# Patient Record
Sex: FEMALE | Race: WHITE | NOT HISPANIC OR LATINO | Employment: STUDENT | URBAN - METROPOLITAN AREA
[De-identification: names, ages, dates, MRNs, and addresses within clinical notes are randomized per-mention and may not be internally consistent; named-entity substitution may affect disease eponyms.]

---
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
## 2019-04-13 ENCOUNTER — OFFICE VISIT - RIVER FALLS (OUTPATIENT)
Dept: FAMILY MEDICINE | Facility: CLINIC | Age: 19
End: 2019-04-13

## 2019-04-16 LAB — AEROBIC CULTURE: NORMAL

## 2020-12-23 ENCOUNTER — OFFICE VISIT - RIVER FALLS (OUTPATIENT)
Dept: FAMILY MEDICINE | Facility: CLINIC | Age: 20
End: 2020-12-23

## 2020-12-28 ENCOUNTER — COMMUNICATION - RIVER FALLS (OUTPATIENT)
Dept: FAMILY MEDICINE | Facility: CLINIC | Age: 20
End: 2020-12-28

## 2021-04-14 ENCOUNTER — COMMUNICATION - RIVER FALLS (OUTPATIENT)
Dept: FAMILY MEDICINE | Facility: CLINIC | Age: 21
End: 2021-04-14

## 2021-04-19 ENCOUNTER — OFFICE VISIT - RIVER FALLS (OUTPATIENT)
Dept: FAMILY MEDICINE | Facility: CLINIC | Age: 21
End: 2021-04-19

## 2021-08-04 ENCOUNTER — OFFICE VISIT - RIVER FALLS (OUTPATIENT)
Dept: FAMILY MEDICINE | Facility: CLINIC | Age: 21
End: 2021-08-04

## 2021-08-12 ENCOUNTER — OFFICE VISIT - RIVER FALLS (OUTPATIENT)
Dept: FAMILY MEDICINE | Facility: CLINIC | Age: 21
End: 2021-08-12

## 2021-09-27 ENCOUNTER — OFFICE VISIT - RIVER FALLS (OUTPATIENT)
Dept: FAMILY MEDICINE | Facility: CLINIC | Age: 21
End: 2021-09-27

## 2021-09-27 ASSESSMENT — MIFFLIN-ST. JEOR: SCORE: 1496.37

## 2021-10-01 ENCOUNTER — COMMUNICATION - RIVER FALLS (OUTPATIENT)
Dept: FAMILY MEDICINE | Facility: CLINIC | Age: 21
End: 2021-10-01

## 2022-01-11 ENCOUNTER — COMMUNICATION - RIVER FALLS (OUTPATIENT)
Dept: FAMILY MEDICINE | Facility: CLINIC | Age: 22
End: 2022-01-11
Payer: COMMERCIAL

## 2022-02-11 VITALS
TEMPERATURE: 98.1 F | DIASTOLIC BLOOD PRESSURE: 76 MMHG | HEART RATE: 76 BPM | WEIGHT: 161 LBS | HEART RATE: 92 BPM | WEIGHT: 165 LBS | SYSTOLIC BLOOD PRESSURE: 122 MMHG | HEIGHT: 67 IN | BODY MASS INDEX: 25.27 KG/M2

## 2022-02-11 VITALS
WEIGHT: 140.4 LBS | BODY MASS INDEX: 21.99 KG/M2 | HEART RATE: 89 BPM | OXYGEN SATURATION: 98 % | DIASTOLIC BLOOD PRESSURE: 70 MMHG | TEMPERATURE: 98.3 F | SYSTOLIC BLOOD PRESSURE: 116 MMHG

## 2022-02-11 VITALS
HEIGHT: 67 IN | WEIGHT: 158 LBS | BODY MASS INDEX: 24.8 KG/M2 | OXYGEN SATURATION: 98 % | HEART RATE: 106 BPM | SYSTOLIC BLOOD PRESSURE: 130 MMHG | DIASTOLIC BLOOD PRESSURE: 70 MMHG | TEMPERATURE: 97.8 F

## 2022-02-11 VITALS
BODY MASS INDEX: 25.06 KG/M2 | BODY MASS INDEX: 26 KG/M2 | SYSTOLIC BLOOD PRESSURE: 108 MMHG | HEART RATE: 72 BPM | SYSTOLIC BLOOD PRESSURE: 118 MMHG | WEIGHT: 156.4 LBS | DIASTOLIC BLOOD PRESSURE: 76 MMHG | HEART RATE: 74 BPM | SYSTOLIC BLOOD PRESSURE: 114 MMHG | WEIGHT: 166 LBS | BODY MASS INDEX: 24.55 KG/M2 | HEIGHT: 67 IN | HEART RATE: 76 BPM | DIASTOLIC BLOOD PRESSURE: 72 MMHG | WEIGHT: 160 LBS | DIASTOLIC BLOOD PRESSURE: 72 MMHG

## 2022-02-16 NOTE — TELEPHONE ENCOUNTER
---------------------  From: Michelle Guillen LPN (Phone Messages Pool ([id number]_[facility]))   Sent: 12/8/2021 10:03:58 AM CST  Subject: Adderall refill     Phone Message    PCP:   none asked for KWL      Time of Call:  9:58am       Person Calling:  pt  Phone number:  _    Returned call at: _    Note:   Pt calling requesting Adderall refill. Pt says it is finals week and she is needing her Rx.  Told her Rx was sent to [facility] on 11/5/21 t o fill in 30 days. She says she has spoken to Caroline and they will not refill it.    Called [facility] and they do have Rx on her profile. They will contact pt once ready.    Last office visit and reason:  9/27/21 female normal annual exam, med refill

## 2022-02-16 NOTE — LETTER
(Inserted Image. Unable to display)   August 20, 2021    YINA MART  [address] [place] [address] [place] [id number]            Dear YINA,      Thank you for selecting [facility] for your healthcare needs.    Our records indicate you are due for the following services:     Follow-up office visit.    (FYI   Regarding office visits: In some instances, a video visit or telephone visit may be offered as an option.)      To schedule an appointment or if you have further questions, please contact your clinic at [phone].      Powered by [employer]    Sincerely,    Vielka Foster MD

## 2022-02-16 NOTE — PROGRESS NOTES
Patient:   YINA ARGUETA            MRN: [id number]            FIN: [id number]               Age:   21 years     Sex:  Female     :  2000   Associated Diagnoses:   Well adult exam; Moderate depressive episode; ADD (attention deficit disorder)   Author:   Vielka Foster MD      Visit Information   Visit type:  Annual exam.    Source of history:  Self.    History limitation:  None.       Chief Complaint   2021 12:27 PM CDT   Patient presents for annual physical and ADHD medicaiton follow up.      Well Adult History   Well Adult History             The patient presents for well adult exam.  The patient's general health status is described as good.  The patient's diet is described as balanced.  Exercise: routine, 2  times per week.  Associated symptoms consist of none.  Last menstrual period: regular, on cycled OCP, gets period every 3 months.  Medical encounters: none.  Additional pertinent history: in long term relationship, declines GC and chlamydia testing.     depression and adhd follow up  patient notes Viibryd is helping somewhat but does not feel like it is fully helpful, has been recommended to see a psychiatrist  had been to psychiatrist previously in [place] but did not have good connection  adhd has been somewhat controlled but feels like symptoms are still present, wondering if there is a slightly higher dose that could be helpful      Review of Systems   ROS reviewed as documented in chart      Health Status   Allergies:    Allergic Reactions (Selected)  Severity Not Documented  Sulfa drug (No reactions were documented)   Medications:  (Selected)   Prescriptions  Prescribed  Adderall XR 20 mg oral capsule, extended release: = 1 cap(s) ( 20 mg ), PO, qAM, # 30 cap(s), 0 Refill(s), Type: Maintenance, Pharmacy: [facility] #[id number], 1 cap(s) Oral qam  Adderall XR 20 mg oral capsule, extended release: = 1 cap(s) ( 20 mg ), PO, qAM, Instructions: fill in 30 days, # 30 cap(s), 0 Refill(s),  Type: Maintenance, Pharmacy: [facility] #[id number], 1 cap(s) Oral qam,Instr:fill in 30 days  Introvale 0.15 mg 30 mcg oral tablet: 1 tab(s), Oral, daily, # 91 tab(s), 4 Refill(s), Type: Maintenance, Pharmacy: [facility] #[id number], 1 tab(s) Oral daily  ProAir HFA 90 mcg/inh inhalation aerosol: 2 puff(s), inh, q4-6 hrs, # 1 EA, 2 Refill(s), Type: Maintenance, Pharmacy: [facility] [id number], 2 puff(s) inh q4-6 hrs  Viibryd 20 mg oral tablet: = 1 tab(s) ( 20 mg ), Oral, daily, # 90 tab(s), 1 Refill(s), Type: Maintenance, Pharmacy: [facility] #[id number], 1 tab(s) Oral daily  chamber spacer with mouthpiece  for use with albuterol: chamber spacer with mouthpiece  for use with albuterol, See Instructions, Instructions: Use with albuterol., # 1 EA, 0 Refill(s), Type: Maintenance, Pharmacy: [facility] [id number],    Medications          *denotes recorded medication          chamber spacer with mouthpiece  for use with albuterol: See Instructions, Use with albuterol., 1 EA.          ProAir HFA 90 mcg/inh inhalation aerosol: 2 puff(s), inh, q4-6 hrs, 1 EA, 2 Refill(s).          Adderall XR 20 mg oral capsule, extended release: 20 mg, 1 cap(s), PO, qAM, fill in 30 days, 30 cap(s), 0 Refill(s).          Adderall XR 20 mg oral capsule, extended release: 20 mg, 1 cap(s), PO, qAM, 30 cap(s), 0 Refill(s).          Introvale 0.15 mg 30 mcg oral tablet: 1 tab(s), Oral, daily, 91 tab(s), 4 Refill(s).          Viibryd 20 mg oral tablet: 20 mg, 1 tab(s), Oral, daily, 90 tab(s), 1 Refill(s).       Problem list:    All Problems  Sleep disorder / SNOMED CT 86694940 / Confirmed  Seasonal allergies / SNOMED CT 108052085 / Confirmed  POTS (postural orthostatic tachycardia syndrome) / SNOMED CT 2858856913 / Confirmed  Moderate depressive episode / SNOMED CT 223333796 / Confirmed  History of chicken pox / SNOMED CT 150556409 / Confirmed  Dysmenorrhea in the adolescent / SNOMED CT 101521348 / Confirmed  Chronic headache disorder /  SNOMED CT 2757974907 / Confirmed  ADD (attention deficit disorder) / SNOMED CT 44458007 / Confirmed  Canceled: Pots (Postural Orthostatic Tachycardia Syndrome) / ICD-9-.0      Histories   Past Medical History:    Active  History of chicken pox (SNOMED CT 895382992): Onset in 2001 at 1 years.  POTS (postural orthostatic tachycardia syndrome) (SNOMED CT 9381314972)  Dysmenorrhea in the adolescent (SNOMED CT 011808741)  Sleep disorder (SNOMED CT 74144345)  Chronic headache disorder (SNOMED CT 8608825193)  Seasonal allergies (SNOMED CT 891075702)   Family History:    Chronic pain syndrome  Mother (Dahlia Mcgraw)     Procedure history:    Esophagogastroduodenoscopy ([id number]) on 6/13/2014 at 14 Years.  Comments:  6/17/2014 2:48 PM CDT - Amita SIMMONS, Irma  Sedation: MAC  Indication: epigastric abdominal pain  Duodenum biopsy: small bowel mucosa without abnormality, no evidence of celiac  Stomach, antrum biopsy: gastric antral mucosa with no abnormalities, no helicobacter organisms identified  Clotest negative   Social History:        Electronic Cigarette/Vaping Assessment            Electronic Cigarette Use: Never.      Alcohol Assessment            Never      Tobacco Assessment            Never            Never (less than 100 in lifetime)      Substance Abuse Assessment            Never      Employment and Education Assessment            Student      Home and Environment Assessment            Marital status: Single.      Nutrition and Health Assessment            Type of diet: Regular.      Exercise and Physical Activity Assessment            Exercise frequency: 1-2 times/week.  Exercise type: Running.      Sexual Assessment            Sexually active: Yes.  Sexual orientation: Heterosexual.  Contraceptive Use Details: Birth control pill,               Condoms.        Physical Examination   Vital Signs   9/27/2021 12:27 PM CDT Temperature Tympanic 97.8 DegF  LOW    Peripheral Pulse Rate 106 bpm  HI    HR Method  Electronic    Systolic Blood Pressure 130 mmHg    Diastolic Blood Pressure 70 mmHg    Mean Arterial Pressure 90 mmHg    BP Site Right arm    BP Method Manual    Oxygen Saturation 98 %      Measurements from flowsheet : Measurements   9/27/2021 12:27 PM CDT Height Measured - Standard 66.5 in    Weight Measured - Standard 158 lb    BSA 1.83 m2    Body Mass Index 25.12 kg/m2  HI      General:  Alert and oriented, No acute distress.    Eye:  Pupils are equal, round and reactive to light, Extraocular movements are intact, Normal conjunctiva.    HENT:  Normocephalic, Tympanic membranes are clear, Oral mucosa is moist, No pharyngeal erythema.    Neck:  Supple, Non-tender, No lymphadenopathy, No thyromegaly.    Respiratory:  Lungs are clear to auscultation.    Cardiovascular:  Normal rate, Regular rhythm.    Breast:  No mass, No tenderness, No discharge.    Gastrointestinal:  Soft, Non-tender, Non-distended, No organomegaly.    Genitourinary:  Normal genitalia for age and sex, No urethral discharge, No lesions.         Perineum: Within normal limits.         Vagina: Within normal limits.         Uterus: Within normal limits.         Ovaries: Within normal limits.         Adnexa: Within normal limits.    Musculoskeletal:  Normal range of motion, Normal strength.    Integumentary:  Warm, Dry, Pink, No rash, no concerning lesions.    Neurologic:  Alert, Oriented, Normal sensory.    Psychiatric:  Cooperative, Appropriate mood & affect.       Impression and Plan   Diagnosis     Well adult exam (JHQ70-HA Z00.00).     Course:  Progressing as expected.    Patient Instructions:       Counseled: Patient, BMI, diet, and exercise.    Diagnosis     Moderate depressive episode (SMI40-CC F32.1).     Course:  will pursue psychiatry referral, order placed.    Orders     Orders (Selected)   Prescriptions  Prescribed  Viibryd 20 mg oral tablet: = 1 tab(s) ( 20 mg ), Oral, daily, # 90 tab(s), 3 Refill(s), Type: Maintenance, Pharmacy: [facility]  [facility]ICKY Ramirez, hold on file, 1 tab(s) Oral daily, 66.5, in, 09/27/21 12:27:00 CDT, Height Measured, 158, lb, 09/27/21 12:27:00 CDT, Weight Measured.     Diagnosis     ADD (attention deficit disorder) (AWE65-LC F98.8).     Course:  persistent symptoms, not fully controlled, will trial increased dose of medications to 25mg and monitor. Follow up in 4 months.    Orders     Orders (Selected)   Prescriptions  Prescribed  Adderall XR 25 mg oral capsule, extended release: = 1 cap(s) ( 25 mg ), Oral, qam, # 30 cap(s), 0 Refill(s), Type: Maintenance, Pharmacy: [facility] [place], [place], 1 cap(s) Oral qam, 66.5, in, 09/27/21 12:27:00 CDT, Height Measured, 158, lb, 09/27/21 12:27:00 CDT, Weight Measured  Adderall XR 25 mg oral capsule, extended release: = 1 cap(s) ( 25 mg ), Oral, qam, Instructions: fill in 30 days, # 30 cap(s), 0 Refill(s), Type: Maintenance, Pharmacy: [facility] [place], [place], 1 cap(s) Oral qam,Instr:fill in 30 days, 66.5, in, 09/27/21 12:27:00 CDT, Height Measured, 158, lb, 09/27/21 12....

## 2022-02-16 NOTE — LETTER
(Inserted Image. Unable to display)   June 21, 2021    YINA MART  [address] [place] [address] [place] [id number]            Dear YINA,      Thank you for selecting [facility] for your healthcare needs.    Our records indicate you are due for the following services:     Annual Physical and Gynecologic Exam ~ Yearly wellness exams are important for your ongoing health and wellness.  This exam gives you the opportunity to meet with your health care provider to review your health, update immunizations and to recommend preventive screenings that you may be due for.  At your wellness visit, your Healthcare Provider will determine the need for a gynecologic exam and/or Pap smear.    (FYI   Regarding office visits: In some instances, a video visit or telephone visit may be offered as an option.)      To schedule an appointment or if you have further questions, please contact your clinic at [phone].      Powered by [employer]    Sincerely,    Vielka Foster MD

## 2022-02-16 NOTE — PROGRESS NOTES
Patient:   YINA ARGUETA            MRN: [id number]            FIN: [id number]               Age:   21 years     Sex:  Female     :  2000   Associated Diagnoses:   ADD (attention deficit disorder)   Author:   Vielka Foster MD      Visit Information      Date of Service: 2021 06:32 am  Performing Location: [facility]  Encounter#: [id number]      Primary Care Provider (PCP):  NONE ,       Referring Provider:  Vielka Foster MD    NPI# [id number]   Participants in room during visit:  patient, self   Location of patient:  home  Location of provider:  office  Video Start Time:  0801 am  Video End Time:   807 am  Video visit via [employer]    Today's visit was conducted via video conference due to the COVID-19 pandemic.  The patient's consent to proceed with a video visit has been obtained and documented.      Chief Complaint   2021 7:38 AM CDT    ADHD med check-verbal consent obtained for a video visit.      History of Present Illness   Patient is being evaluated via a billable video visit.  patient due for ADHD follow up  had been struggling with focus and energy, better on Adderall XR   feels like depression is controlled on 20mg  has not yet started back in school  between jobs  hoping to get back to school this fall  has been getting more done around the house         Health Status   Allergies:    Allergic Reactions (Selected)  Severity Not Documented  Sulfa drug (No reactions were documented)   Medications:  (Selected)   Prescriptions  Prescribed  Adderall XR 20 mg oral capsule, extended release: = 1 cap(s) ( 20 mg ), PO, qAM, # 30 cap(s), 0 Refill(s), Type: Maintenance, Pharmacy: [facility] #[id number], did not fill prior to returning to [place], has been cancelled at [facility] in [place], [place], 1 cap(s) Oral qam, 140.4, lb, 19 8:...  Adderall XR 20 mg oral capsule, extended release: = 1 cap(s) ( 20 mg ), PO, qAM, Instructions: fill in 30 days, # 30 cap(s),  0 Refill(s), Type: Maintenance, Pharmacy: [facility] #[id number], 1 cap(s) Oral qam,Instr:fill in 30 days, 140.4, lb, 04/13/19 8:15:00 CDT, Weight Measured  Introvale 0.15 mg 30 mcg oral tablet: 1 tab(s), Oral, daily, # 91 tab(s), 4 Refill(s), Type: Maintenance, Pharmacy: [facility] #[id number], 1 tab(s) Oral daily  ProAir HFA 90 mcg/inh inhalation aerosol: 2 puff(s), inh, q4-6 hrs, # 1 EA, 2 Refill(s), Type: Maintenance, Pharmacy: [facility] [id number], 2 puff(s) inh q4-6 hrs  Viibryd 20 mg oral tablet: = 1 tab(s) ( 20 mg ), Oral, daily, # 90 tab(s), 1 Refill(s), Type: Maintenance, Pharm[facility]: [id number], 1 tab(s) Oral daily  chamber spacer with mouthpiece  for use with albuterol: chamber spacer with mouthpiece  for use with albuterol, See Instructions, Instructions: Use with albuterol., # 1 EA, 0 Refill(s), Type: Maintenance, Pharmacy: [facility] [id number]   Problem list:    All Problems  ADD (attention deficit disorder) / SNOMED CT 01238570 / Confirmed  Chronic headache disorder / SNOMED CT 5873544536 / Confirmed  Dysmenorrhea in the adolescent / SNOMED CT 515502871 / Confirmed  History of chicken pox / SNOMED CT 738720058 / Confirmed  Moderate depressive episode / SNOMED CT 441376532 / Confirmed  POTS (postural orthostatic tachycardia syndrome) / SNOMED CT 4139145210 / Confirmed  Sleep disorder / SNOMED CT 77283063 / Confirmed  Seasonal allergies / SNOMED CT 434027894 / Confirmed      Histories   Past Medical History:    Active  History of chicken pox (SNOMED CT 570248274): Onset in 2001 at 1 years.  POTS (postural orthostatic tachycardia syndrome) (SNOMED CT 9706882184)  Dysmenorrhea in the adolescent (SNOMED CT 344791033)  Sleep disorder (SNOMED CT 59318640)  Chronic headache disorder (SNOMED CT 3476164687)  Seasonal allergies (SNOMED CT 854165123)   Family History:    Chronic pain syndrome  Mother (Dahlia Mcgraw)     Procedure history:    Esophagogastroduodenoscopy ([id number]) on  6/13/2014 at 14 Years.  Comments:  6/17/2014 2:48 PM DOMINGO Levi RN, Irma  Sedation: MAC  Indication: epigastric abdominal pain  Duodenum biopsy: small bowel mucosa without abnormality, no evidence of celiac  Stomach, antrum biopsy: gastric antral mucosa with no abnormalities, no helicobacter organisms identified  Clotest negative   Social History:        Electronic Cigarette/Vaping Assessment            Electronic Cigarette Use: Never.      Alcohol Assessment            Never      Tobacco Assessment            Never            Never (less than 100 in lifetime)      Substance Abuse Assessment            Never      Employment and Education Assessment            Student      Home and Environment Assessment            Marital status: Single.      Nutrition and Health Assessment            Type of diet: Regular.      Exercise and Physical Activity Assessment            Exercise frequency: 1-2 times/week.  Exercise type: Running.      Sexual Assessment            Sexually active: Yes.  Sexual orientation: Heterosexual.  Contraceptive Use Details: Birth control pill,               Condoms.        Physical Examination   General:  Alert and oriented, No acute distress.    Eye:  Pupils are equal, round and reactive to light, Normal conjunctiva.    HENT:  Oral mucosa is moist.    Neck:  Supple.    Respiratory:  Respirations are non-labored.    Psychiatric:  Cooperative, Appropriate mood & affect, Normal judgment.       Impression and Plan   Diagnosis     ADD (attention deficit disorder) (PNI09-ML F98.8).     Course:  Improving.    Orders     Orders (Selected)   Outpatient Orders  Ordered  Return to Clinic (Request): RFV: Yearly px, Return in 2 mo  Order  Return to Clinic (Request): RFV: call for ADHD refills in 2 months; visit due in 4 months, Return in 4 months  Prescriptions  Prescribed  Adderall XR 20 mg oral capsule, extended release: = 1 cap(s) ( 20 mg ), PO, qAM, # 30 cap(s), 0 Refill(s), Type: Maintenance,  Pharmacy: [facility] #[id number], did not fill prior to returning to [place], has been cancelled at [facility] in [place], [place], 1 cap(s) Oral qam  Adderall XR 20 mg oral capsule, extended release: = 1 cap(s) ( 20 mg ), PO, qAM, Instructions: fill in 30 days, # 30 cap(s), 0 Refill(s), Type: Maintenance, Pharmacy: [facility] #[id number], 1 cap(s) Oral qam,Instr:fill in 30 days.

## 2022-02-16 NOTE — TELEPHONE ENCOUNTER
---------------------  From: Dk/Anne PARSONS (Phone Messages Mineralist ([id number]_[facility]))   To: Vielka Foster MD;     Sent: 8/4/2021 8:54:20 AM CDT  Subject: General Message     Phone Message    PCP: KWL    Time of Call: 0848    Phone Number: [phone]    Returned call at: n/a    Note: Patient calls stating that she has a px today with KWL but on Saturday she came down with sinus pressure/coughing. Patient states that she has not been exposed to covid that she is aware of and has had both coivd vaccines. Patient wondering if she is ok to come in today or if she should reschedule.    What is KWL preferences?OK to come if that is her preference, can reschedule if not feeling up to visit or change to virtual to discuss cough/ADHD if she is in [place] and delay physical.---------------------  From: Vielka Foster MD   To: Phone Messages Pool ([id number]_[place] [facility] [place]);     Sent: 8/4/2021 9:12:51 AM CDT  Subject: RE: General MessageSpoke to pt, she states that she would like to reschedule as she does not think she will have a voice later today.

## 2022-02-16 NOTE — NURSING NOTE
Depression Screening Entered On:  10/14/2021 11:05 AM CDT    Performed On:  9/27/2021 11:04 AM CDT by Rosalie Ho               Depression Screening   Little Interest - Pleasure in Activities :   Several days   Feeling Down, Depressed, Hopeless :   Several days   Initial Depression Screen Score :   2 Score   Poor Appetite or Overeating :   Several days   Trouble Falling or Staying Asleep :   Several days   Feeling Tired or Little Energy :   Not at all   Feeling Bad About Yourself :   Several days   Trouble Concentrating :   Nearly every day   Moving or Speaking Slowly :   Not at all   Thoughts Better Off Dead or Hurting Self :   Not at all   Difficulty at Work, Home, Getting Along :   Somewhat difficult   Detailed Depression Screen Score :   6    Total Depression Screen Score :   8    Rosalie Ho - 10/14/2021 11:04 AM CDT

## 2022-02-16 NOTE — NURSING NOTE
Comprehensive Intake Entered On:  4/13/2019 8:18 AM CDT    Performed On:  4/13/2019 8:15 AM CDT by Page Sharp               Summary   Chief Complaint :   Skin concern on back of left leg.  Concern for ingrown hair.    Menstrual Status :   Menarcheal   Weight Measured :   140.4 lb(Converted to: 140 lb 6 oz, 63.68 kg)    Systolic Blood Pressure :   116 mmHg   Diastolic Blood Pressure :   70 mmHg   Mean Arterial Pressure :   85 mmHg   Peripheral Pulse Rate :   89 bpm   Temperature Tympanic :   98.3 DegF(Converted to: 36.8 DegC)    Oxygen Saturation :   98 %   Race :      Languages :   English   Ethnicity :   Not  or    Page Sharp - 4/13/2019 8:15 AM CDT   Health Status   Allergies Verified? :   Yes   Medication History Verified? :   Yes   Tobacco Use? :   Never smoker   Page Sharp - 4/13/2019 8:15 AM CDT   Meds / Allergies   (As Of: 4/13/2019 8:18:09 AM CDT)   Allergies (Active)   sulfa drug  Estimated Onset Date:   Unspecified ; Comments:     Comment 1: Mom and grandma highly allergic to sulfa   ; Created By:   Berta León; Reaction Status:   Active ; Category:   Drug ; Substance:   sulfa drug ; Type:   Allergy ; Updated By:   Berta León; Reviewed Date:   8/16/2017 1:23 PM CDT        Medication List   (As Of: 4/13/2019 8:18:09 AM CDT)   Prescription/Discharge Order    ethinyl estradiol-levonorgestrel  :   ethinyl estradiol-levonorgestrel ; Status:   Prescribed ; Ordered As Mnemonic:   Quasense 0.15 mg-30 mcg oral tablet ; Simple Display Line:   1 tab(s), po, daily, for 30 day(s), 30 tab(s), 0 Refill(s) ; Ordering Provider:   April Fried; Catalog Code:   ethinyl estradiol-levonorgestrel ; Order Dt/Tm:   12/21/2017 11:43:19 AM          buPROPion  :   buPROPion ; Status:   Processing ; Ordered As Mnemonic:   Wellbutrin  mg/24 hours oral tablet, extended release ; Ordering Provider:   April Fried; Action Display:   Complete ; Catalog Code:    buPROPion ; Order Dt/Tm:   4/13/2019 8:16:07 AM          amphetamine-dextroamphetamine  :   amphetamine-dextroamphetamine ; Status:   Prescribed ; Ordered As Mnemonic:   Adderall XR 20 mg oral capsule, extended release ; Simple Display Line:   20 mg, 1 cap(s), PO, qAM, 30 cap(s), 0 Refill(s) ; Ordering Provider:   April Fried; Catalog Code:   amphetamine-dextroamphetamine ; Order Dt/Tm:   8/4/2017 5:38:18 PM          amphetamine-dextroamphetamine  :   amphetamine-dextroamphetamine ; Status:   Processing ; Ordered As Mnemonic:   Adderall XR 20 mg oral capsule, extended release ; Ordering Provider:   April Fried; Action Display:   Complete ; Catalog Code:   amphetamine-dextroamphetamine ; Order Dt/Tm:   4/13/2019 8:16:14 AM          naproxen  :   naproxen ; Status:   Prescribed ; Ordered As Mnemonic:   Naprosyn 500 mg oral tablet ; Simple Display Line:   500 mg, 1 tab(s), PO, BID, 60 tab(s), 0 Refill(s) ; Ordering Provider:   April Fried; Catalog Code:   naproxen ; Order Dt/Tm:   6/5/2017 6:10:10 PM          albuterol  :   albuterol ; Status:   Prescribed ; Ordered As Mnemonic:   ProAir HFA 90 mcg/inh inhalation aerosol ; Simple Display Line:   2 puff(s), inh, q4-6 hrs, 1 EA, 2 Refill(s) ; Ordering Provider:   Jackie Armijo MD; Catalog Code:   albuterol ; Order Dt/Tm:   9/21/2015 5:20:08 PM          Miscellaneous Prescription  :   Miscellaneous Prescription ; Status:   Prescribed ; Ordered As Mnemonic:   chamber spacer with mouthpiece  for use with albuterol ; Simple Display Line:   See Instructions, Use with albuterol., 1 EA ; Ordering Provider:   Macy Garcia MD; Catalog Code:   Miscellaneous Prescription ; Order Dt/Tm:   9/20/2012 9:48:17 AM

## 2022-02-16 NOTE — TELEPHONE ENCOUNTER
Entered by Vielka Foster MD on November 05, 2021 1:03:30 PM CDT  ---------------------  From: Vielka Foster MD   To: MICKY Long    Sent: 11/5/2021 1:03:30 PM CDT  Subject: Medication Management     ** Not Approved: responded to by other means **  amphetamine-dextroamphetamine (ADDERALL XR 25MG CP24)  TAKE ONE CAPSULE BY MOUTH EVERY DAY IN THE MORNING  Qty:  30 cap(s)        Days Supply:  30        Refills:  0          NOHEMI     Route To Pharmacy - [facility] [place], [place]               ** Patient matched by Vielka Foster MD on 11/5/2021 1:03:13 PM CDT **      ------------------------------------------  From: MICKY Long  To: Vielka Foster MD  Sent: November 4, 2021 3:52:44 PM CDT  Subject: Medication Management  Due: October 21, 2021 8:18:07 PM CDT     ** On Hold Pending Signature **     Drug: amphetamine-dextroamphetamine (Adderall XR 25 mg oral capsule, extended release), TAKE ONE CAPSULE BY MOUTH EVERY DAY IN THE MORNING  Quantity: 30 cap(s)  Days Supply: 30  Refills: 0  Substitutions Allowed  Notes from Pharmacy:     Dispensed Drug: amphetamine-dextroamphetamine (Adderall XR 25 mg oral capsule, extended release), TAKE ONE CAPSULE BY MOUTH EVERY DAY IN THE MORNING  Quantity: 30 cap(s)  Days Supply: 30  Refills: 0  Substitutions Allowed  Notes from Pharmacy:  ------------------------------------------  ** Submitted: **  Order:amphetamine-dextroamphetamine (Adderall XR 25 mg oral capsule, extended release)  1 cap(s)  Oral  qam  fill in 30 days  Qty:  30 cap(s)        Refills:  0          Substitutions Allowed     Route To Pharmacy - [facility] [place], [place]    Signed by Vielka Foster MD  11/5/2021 6:03:00 PM [facility]    ** Submitted: **  Order:amphetamine-dextroamphetamine (Adderall XR 25 mg oral capsule, extended release)  1 cap(s)  Oral  qam  Qty:  30 cap(s)        Refills:  0          Substitutions Allowed     Route To Pharmacy - [facility] [place], [place]    Signed by Vielka Foster MD  11/5/2021  6:03:00 PM [facility]

## 2022-02-16 NOTE — TELEPHONE ENCOUNTER
"---------------------  From: Michelle Guillen LPN (Phone Messages Pool (32224_[place]))   To: [facility] Message Pool ([address]);     Sent: 12/23/2020 1:59:18 PM CST  Subject: Adderall     Phone Message    PCP:   none asked for [facility]      Time of Call:  1:27pm       Person Calling:  Holly Damian [facility]  Phone number:  [phone]    Returned call at: 1:39pm    Note:   Mayajay LM stating they received Rx for Adderall and need some verification because Rx came from a family medicine clinic. Mayajay says this should really be going through psychiatry or mental health before going to primary clinic. He says this is the first time they have dealt with pt.    Returned call to see what verification Betty was needing. He thought it was suspicious that pt had an appt with provider in [place] but using a [place] pharmacy. Betty says that now the best approach with pharmacies in [place] is to use a non controlled substance approach first (Strattera or clonidine) or therapy. Betty says they are having a problem in [place] right now with Vyvanse and Adderall and there has been \"drug busts\" so they are now monitoring Rx's closely. Betty suggested pt try and fail a non controlled substance approach first before starting on Adderall since it did not look like she has been on it in quite some time.    Deneen says they will need call back with ok to dispense.    Last office visit and reason:  12/23/20 phone visit---------------------  From: Nanette Duarte CMA ([facility] Message Pool (32224_[facility]))   To: Vielka Foster MD;     Sent: 12/23/2020 2:26:01 PM CST  Subject: FW: AdderallOK to dispense. Patient diagnosed with ADHD in elementary school. Returning to school. Adderall had previously been used for patient starting in 2017 with good results. Had discontinued while not in school. Has seen psychiatry in the past.---------------------  From: Vielka Foster MD   To: [facility] Message Pool " (32224_[facility]);     Sent: 12/23/2020 2:32:15 PM CST  Subject: RE: AdderallCalled and informed Betty at 2:38pm. He expresses understanding and agrees to dispense for pt.

## 2022-02-16 NOTE — TELEPHONE ENCOUNTER
---------------------  From: Tina HAYWARDDavid   To: [facility] Message Pool (32224_[facility]);     Sent: 4/14/2021 3:42:04 PM CDT  Subject: General Message     Phone message    PCP:   Noah MAHONEY      Time of Call:  _ 330       Person Calling:  _ Self  Phone number:  _ [phone]    Returned call at: _    Note:   _ Patient is switching pharmacy to [facility] in [place] and would like new scripts sent in for Adderall and Birth control.  Orders pended.  Pharmacy changed.  Routing for review.    Last office visit and reason:  _Looks like David teed up refills. Can you sign off on BCP refill? She is due for a ADHD med check so I will let her know this (last visit in Dec 2020)---------------------  From: Nathalie Patterson CMA ([employer] Message Pool (32224_[facility]))   To: Vielka Foster MD;     Sent: 4/14/2021 3:45:34 PM CDT  Subject: FW: General MessageCan you call pt to get scheduled for ADHD med check w/ [facility]? Needs to have visit for refills. Could do phone visit tomorrow---------------------  From: Nathalie Patterson CMA ([facility] Message Pool (32224_[facility]))   To: Appointment Pool (32224_[place]);     Sent: 4/14/2021 5:28:19 PM CDT  Subject: FW: General MessageSigned the viibryd and OCP prescriptions. Agree with visit for adhd---------------------  From: Vielka Foster MD   To: [facility] Message Pool (32224_[facility]);     Sent: 4/14/2021 6:03:12 PM CDT  Subject: RE: General MessageLMx1 for patient to call for appt w KWLPt returned call was notified to schedule add f/u med check.Scheduled

## 2022-02-16 NOTE — PROGRESS NOTES
Patient:   YINA ARGUETA            MRN: [id number]            FIN: [id number]               Age:   20 years     Sex:  Female     :  2000   Associated Diagnoses:   ADD (attention deficit disorder); Dysmenorrhea in the adolescent; Moderate depressive episode   Author:   Vielka Foster MD      Visit Information      Date of Service: 2020 07:04 am  Performing Location: [facility]  Encounter#: [id number]      Primary Care Provider (PCP):  NONE ,       Referring Provider:  Vielka Foster MD    NPI# [id number]   Visit type:  Telephone Encounter.    Source of history:  Patient.    Location of patient:  driving through [place]  Call Start Time:   1206 pm  Call End Time:    _1215 pm      Chief Complaint   2020 11:34 AM CST  Discuss meds- would like to go back on BCP and ADD meds- verbal consent given for phone visit  (Modified)   _      History of Present Illness   Today's visit was conducted via telephone due to the COVID-19 pandemic. Patient's consent to telephone visit was obtained and documented.      Reason for visit:    ADD follow up, patient diagnosed in elementary school, resumed treatment in  when starting college, Adderall was effective, has not been in school past couple of years so had stopped treatment, now will be starting back for spring semester, would like to restart Adderall, notes that she continues to live in [place], is currently traveling home to [place] to visit parents for [place]   OCP discussed, patient has been well controlled in the past on OCP, controls periods, uses continuous dosing as noted  patient also notes that she had been following with psychiatrist, started on Viibryd, has been very effective, has been unable to get ongoing refills, outside records confirm dosing, ok to continue      Impression and Plan   Diagnosis     ADD (attention deficit disorder) (HPC73-LW F98.8).     Plan:  had previously tolerated Adderall XR, refills sent,  follow up in 2 months.    Orders     Orders (Selected)   Prescriptions  Prescribed  Adderall XR 20 mg oral capsule, extended release: = 1 cap(s) ( 20 mg ), PO, qAM, # 30 cap(s), 0 Refill(s), Type: Maintenance, Pharmacy: [facility] #[id number], 1 cap(s) Oral qam, 140.4, lb, 04/13/19 8:15:00 CDT, Weight Measured  Adderall XR 20 mg oral capsule, extended release: = 1 cap(s) ( 20 mg ), PO, qAM, Instructions: fill in 30 days, # 30 cap(s), 0 Refill(s), Type: Maintenance, Pharmacy: [facility] #[id number], 1 cap(s) Oral qam,Instr:fill in 30 days, 140.4, lb, 04/13/19 8:15:00 CDT, Weight Measured.     Diagnosis     Dysmenorrhea in the adolescent (GFD46-HK N94.6).     Course:  restart OCP as ordered, patient will plan preventive visit next summer.    Orders     Orders (Selected)   Prescriptions  Prescribed  Introvale 0.15 mg 30 mcg oral tablet: 1 tab(s), Oral, daily, # 91 tab(s), 4 Refill(s), Type: Maintenance, Pharmacy: [facility] #[id number], 1 tab(s) Oral daily, 140.4, lb, 04/13/19 8:15:00 CDT, Weight Measured.     Diagnosis     Moderate depressive episode (ZHF01-DQ F32.1).     Course:  has been well controlled on Viibryd, refills sent.       Health Status   Allergies:    Allergic Reactions (Selected)  Severity Not Documented  Sulfa drug (No reactions were documented)   Medications:  (Selected)   Prescriptions  Prescribed  Adderall XR 20 mg oral capsule, extended release: = 1 cap(s) ( 20 mg ), PO, qAM, # 30 cap(s), 0 Refill(s), Type: Maintenance, Pharmacy: [facility] #[id number], 1 cap(s) Oral qam, 140.4, lb, 04/13/19 8:15:00 CDT, Weight Measured  Adderall XR 20 mg oral capsule, extended release: = 1 cap(s) ( 20 mg ), PO, qAM, Instructions: fill in 30 days, # 30 cap(s), 0 Refill(s), Type: Maintenance, Pharmacy: [facility] #[id number], 1 cap(s) Oral qam,Instr:fill in 30 days, 140.4, lb, 04/13/19 8:15:00 CDT, Weight Measured  Introvale 0.15 mg 30 mcg oral tablet: 1 tab(s), Oral, daily, # 91 tab(s), 4  Refill(s), Type: Maintenance, Pharmacy: [facility] #[id number], 1 tab(s) Oral daily, 140.4, lb, 04/13/19 8:15:00 CDT, Weight Measured  ProAir HFA 90 mcg/inh inhalation aerosol: 2 puff(s), inh, q4-6 hrs, # 1 EA, 2 Refill(s), Type: Maintenance, Pharmacy: [id number], 2 puff(s) inh q4-6 hrs  chamber spacer with mouthpiece  for use with albuterol: chamber spacer with mouthpiece  for use with albuterol, See Instructions, Instructions: Use with albuterol., # 1 EA, 0 Refill(s), Type: Maintenance, Pharmacy: [id number]   Problem list:    All Problems  Chronic headache disorder / SNOMED CT 7438360863 / Confirmed  Dysmenorrhea in the adolescent / SNOMED CT 255472898 / Confirmed  History of chicken pox / SNOMED CT 627405361 / Confirmed  POTS (postural orthostatic tachycardia syndrome) / SNOMED CT 5245713302 / Confirmed  Sleep disorder / SNOMED CT 46997697 / Confirmed  Seasonal allergies / SNOMED CT 582586990 / Confirmed      Histories   Past Medical History:    Active  History of chicken pox (SNOMED CT 422134467): Onset in 2001 at 1 years.  POTS (postural orthostatic tachycardia syndrome) (SNOMED CT 4342401347)  Dysmenorrhea in the adolescent (SNOMED CT 692233789)  Sleep disorder (SNOMED CT 95245617)  Chronic headache disorder (SNOMED CT 5760346141)  Seasonal allergies (SNOMED CT 257800152)   Family History:    Chronic pain syndrome  Mother (Dahlia Mcgraw)     Procedure history:    Esophagogastroduodenoscopy (298448633) on 6/13/2014 at 14 Years.  Comments:  6/17/2014 2:48 PM [facility] - Irma Levi RN  Sedation: MAC  Indication: epigastric abdominal pain  Duodenum biopsy: small bowel mucosa without abnormality, no evidence of celiac  Stomach, antrum biopsy: gastric antral mucosa with no abnormalities, no helicobacter organisms identified  Clotest negative   Social History:        Electronic Cigarette/Vaping Assessment            Electronic Cigarette Use: Never.      Alcohol Assessment             Never      Tobacco Assessment            Never            Never (less than 100 in lifetime)      Substance Abuse Assessment            Never      Employment and Education Assessment            Student      Home and Environment Assessment            Marital status: Single.      Nutrition and Health Assessment            Type of diet: Regular.      Exercise and Physical Activity Assessment            Exercise frequency: 1-2 times/week.  Exercise type: Running.      Sexual Assessment            Sexually active: Yes.  Sexual orientation: Heterosexual.  Contraceptive Use Details: Birth control pill,               Condoms.

## 2022-02-16 NOTE — TELEPHONE ENCOUNTER
---------------------  From: Michelle Guillen LPN (Phone Messages Pool (32224_[facility]))   Sent: 4/12/2019 3:21:27 PM CDT  Subject: infected hair     Phone Message    PCP:   none      Time of Call:  2:54pm       Person Calling:  pt larissa Villagomez- No TETO  Phone number:  [phone]    Returned call at: 3:16pm    Note:   Dahlia LM stating pt is coming home from [place] this weekend and says she has to have this cleared by a nurse before pt can be seen in urgent care.    Returned call and Dahlia says pt gets ingrown hairs in her bikini line and she has one now that is infected. She says pt had been trying to get into the clinic in [place] for 2-3 days but couldn't. She is wondering if pt can be seen in urgent care for this.    Told Dahlia that pt can be seen tomorrow in urgent care.    Last office visit and reason:  8/16/17 vertigo with [facility]

## 2022-02-16 NOTE — TELEPHONE ENCOUNTER
---------------------  From: Nathalie Patterson CMA   To: Appointment Pool ([address]_[place]);     Sent: 7/30/2021 3:28:14 PM CDT  Subject: upcoming appt     pt is scheduled for a 40 min med refill appt with [facility] on Wed. She is due for annual px and pap. Please call pt to confirm she is coming in for px,pap and med refill? If so, please change appt type to px so proper paper work is given.     Thanks!Reached patient.  Ok to change to px. Scheduled for px at same time, and changed appt type to reflect the correct appt.

## 2022-02-16 NOTE — PROGRESS NOTES
Patient:   YINA ARGUETA            MRN: [id number]            FIN: [id number]               Age:   17 years     Sex:  Female     :  2000   Associated Diagnoses:   ADD (attention deficit disorder); Anxiety   Author:   April Fried      Visit Information      Primary Care Provider (PCP):  April Fried    NPI# [id number]      Chief Complaint   2017 3:25 PM CDT    f/u anxiety. Also discuss attention issues - life in general.        History of Present Illness   PPC to readdress anxiety status, has been on sertraline for over one month, feels this has helped but not taken all symptoms away, has caused non existant sex life, she denies thoughts of suicide   she brings up ADD, was diagnosed with this as child although never treated, she felt this was more controlled in high school but now that she is in college and has a job she is having a harder time concentrating  took [place] screening tool: scored 96: high positive  she denies altered thought process   would like to try medication to help focus      Review of Systems   Constitutional:  Negative.    Ear/Nose/Mouth/Throat:  Negative.    Respiratory:  Negative.    Cardiovascular:  Negative.    Gastrointestinal:  Negative.    Hematology/Lymphatics:  Negative.    Immunologic:  Negative.    Musculoskeletal:  Negative.    Integumentary:  Negative.    Neurologic:  Negative.    Psychiatric:  Anxiety, lack of focus, No depression, No lurdes, Not suicidal, Not delusional, No hallucinations.       Health Status   Allergies:    Allergic Reactions (Selected)  Severity Not Documented  Sulfa drug (No reactions were documented)   Medications:  (Selected)   Prescriptions  Prescribed  Adderall XR 20 mg oral capsule, extended release: 1 cap(s) ( 20 mg ), PO, qAM, # 30 cap(s), 0 Refill(s), Type: Maintenance, Pharmacy: [facility], okay to fill after 17, 1 cap(s) po qam  Naprosyn 500 mg oral tablet: 1 tab(s) ( 500 mg ), PO, BID, # 60  tab(s), 0 Refill(s), Type: Maintenance, Pharmacy: [facility], 1 tab(s) po bid  ProAir HFA 90 mcg/inh inhalation aerosol: 2 puff(s), inh, q4-6 hrs, # 1 EA, 2 Refill(s), Type: Maintenance, Pharmacy: [facility] [id number], 2 puff(s) inh q4-6 hrs  Quasense 0.15 mg-30 mcg oral tablet: 1 tab(s), po, daily, Instructions: Pt is due for an appt before further refills., # 91 tab(s), 4 Refill(s), Type: Maintenance, Pharmacy: [facility], 1 tab(s) po daily,x91 day(s),Instr:Pt is due for an appt before further refills.  chamber spacer with mouthpiece  for use with albuterol: chamber spacer with mouthpiece  for use with albuterol, See Instructions, Instructions: Use with albuterol., # 1 EA, 0 Refill(s), Type: Maintenance, Pharmacy: [facility]  sertraline 100 mg oral tablet: 1.5 tab(s) ( 150 mg ), PO, Daily, # 135 tab(s), 1 Refill(s), Type: Maintenance, Pharmacy: [facility] [address], dose increase, 1.5 tab(s) po daily   Problem list:    All Problems (Selected)  Chronic headache disorder / SNOMED CT 3248276630 / Confirmed  Dysmenorrhea in the adolescent / SNOMED CT 610786397 / Confirmed  History of chicken pox / SNOMED CT 507913848 / Confirmed  POTS (postural orthostatic tachycardia syndrome) / SNOMED CT 0205597455 / Confirmed  Seasonal allergies / SNOMED CT 293240286 / Confirmed  Sleep disorder / SNOMED CT 52710731 / Confirmed      Histories   Past Medical History:    Active  History of chicken pox (624829278): Onset in 2001 at 1 years.  POTS (postural orthostatic tachycardia syndrome) (0078554215)  Dysmenorrhea in the adolescent (549709305)  Sleep disorder (39297783)  Chronic headache disorder (2548415698)  Seasonal allergies (545194935)   Family History:    Chronic pain syndrome  Mother (Dahlia Mcgraw)     Social History:        Alcohol Assessment            Never      Tobacco Assessment            Never            Never      Substance Abuse Assessment            Never       Employment and Education Assessment            Student      Home and Environment Assessment            Marital status: Single.      Nutrition and Health Assessment            Type of diet: Regular.      Exercise and Physical Activity Assessment            Exercise frequency: 1-2 times/week.  Exercise type: Running.      Sexual Assessment            Sexually active: Yes.  Sexual orientation: Heterosexual.  Contraceptive Use Details: Birth control pill,               Condoms.        Physical Examination   Vital Signs   5/24/2017 3:25 PM CDT Peripheral Pulse Rate 76 bpm    Pulse Site Radial artery    HR Method Manual    Systolic Blood Pressure 118 mmHg    Diastolic Blood Pressure 76 mmHg    Mean Arterial Pressure 90 mmHg    BP Site Right arm    BP Method Manual      General:  Alert and oriented, No acute distress.    Eye:  Pupils are equal, round and reactive to light.    HENT:  Normocephalic.    Neck:  Supple.    Musculoskeletal:  Normal range of motion.    Integumentary:  Warm, Dry, Pink.    Neurologic:  Alert, Oriented, Normal sensory, No focal deficits.    Psychiatric:  Cooperative, Appropriate mood & affect, Normal judgment, Non-suicidal.       Impression and Plan   Diagnosis     ADD (attention deficit disorder) (YRA39-TW F98.8).     Anxiety (XIG34-GL F41.9).     Patient Instructions:          Limitations: Alcohol consumption.         Counseled: Patient, Regarding diagnosis, Regarding treatment, Regarding medications, Activity, Verbalized understanding.    Summary:  discussed [place] scale/screening tool, agrees with trial on adderall, explained she needs to be responsible for securing prescription, lost prescriptions will not be refilled, needs to responsible for knowing when refills are required, no Friday afternoon calls requesting refills because she is out  annual drug testing required, prn drug testing may also be required  difficult to know what insurance will pay for, will try adderall XR20mg but  may need to trial immediate release first  she will need to be seen in 60d to see how she is feeling  at this time, she would like to remain on sertraline but if her sex life is continuing to decline we may need to reapproach this, if conurrent sertaline and adderall are too much of a stimulant we will need to stop one of these medications,   i have spent over 25 mintues with pt today with over 50% in counseling and education.    Orders     Orders (Selected)   Prescriptions  Prescribed  Adderall XR 20 mg oral capsule, extended release: 1 cap(s) ( 20 mg ), PO, qAM, # 30 cap(s), 0 Refill(s), Type: Maintenance, Pharmacy: [facility], okay to fill after 6/24/17, 1 cap(s) po qam.

## 2022-02-16 NOTE — TELEPHONE ENCOUNTER
---------------------  From: Nathalie Patterson CMA (Phone Messages Pool (32224_[place]))   To: [facility] Message Pool ([facility]);     Sent: 12/28/2020 2:02:52 PM CST  Subject: Refills     PCP:   None listed-phone visit w/ MARU on 12/23    Time of Call:  11:45am       Person Calling:  Pt  Phone number:  [phone]      Note:   Pt calls stating that MARU sent in 2 of her scripts to [facility] in [place]-she said that the rx's were not ready for  and will now be traveling back to [place] so wondering if we can cancel refills at [place] and just send in scripts to [facility]. Looks like 1 Adderall script and Introvale would need to be sent in.     OK?     Last office visit and reason:  _---------------------  From: Nathalie Patterson CMA ([facility] Message Pool (32224_[facility]))   To: Vielka Foster MD;     Sent: 12/28/2020 2:03:19 PM CST  Subject: FW: RefillsFor Tuesday, can you please call [place] to see what happened with her prescriptions?---------------------  From: Vielka Foster MD   To: [facility] Message Pool ([address]224_[facility]);     Sent: 12/28/2020 4:55:07 PM CST  Subject: RE: RefillsI called [facility]-both are ready for her to . Should I cancel and you can resend to [place]?---------------------  From: Nathalie Patterson CMA ([employer] Message Pool (32224_[facility]))   To: Vielka Foster MD;     Sent: 12/29/2020 12:51:49 PM CST  Subject: RE: RefillsYes. OK to cancel. I will send to [facility].  ** Submitted: **  Order:amphetamine-dextroamphetamine (Adderall XR 20 mg oral capsule, extended release)  1 cap(s)  PO  qAM  Qty:  30 cap(s)        Refills:  0          Substitutions Allowed     [id number]    Signed by Vielka Foster MD  12/29/2020 7:01:00 PM [facility]    ** Submitted: **  Order:ethinyl estradiol-levonorgestrel (Introvale 0.15 mg 30 mcg oral tablet)  1 tab(s)  Oral  daily  Qty:  91 tab(s)        Refills:  4          Substitutions  Allowed     Route To [facility] #[id number]    Signed by Vielka Foster MD  12/29/2020 7:01:00 PM [facility]    ** Submitted: **  Order:vilazodone (Viibryd 20 mg oral tablet)  1 tab(s)  Oral  daily  Qty:  90 tab(s)        Refills:  1          Substitutions Allowed     Route To [facility] #[id number]    Signed by Vielka Foster MD  12/29/2020 7:01:00 PM [facility]---------------------  From: Vielka Foster MD   To: [employer] Pool (32224_[facility]);     Sent: 12/29/2020 1:07:41 PM CST  Subject: RE: RefillsPt notified at 6pm and rx's cancelled at [facility]

## 2022-02-16 NOTE — PROGRESS NOTES
Chief Complaint    dizziness, nausea, lack of sleep x3 days  History of Present Illness      Woke up dizzy a few days ago. Feels nauseated. Moving triggers the dizziness. Feels good when sitting still and not moving her head. Feels like the room is spinning.  Review of Systems      Denies vision or hearing changes. Denies fevers or vomiting.       LMP early June with menses every 3 months on OCP       Current relationship       PHQ: 21pts - no suicidal thoughts       MARY-7: 20pts       Has not started Wellbutrin and working with NP on her anxiety and depression  Physical Exam   Vitals & Measurements    T: 98.1(Tympanic)  HR: 92(Peripheral)     HT: 67 in  WT: 161 lb  BMI: 25.21       General: No acute distress.      HENT: Tympanic membranes are clear, No pharyngeal erythema.      Neck: No lymphadenopathy.      Respiratory: Lungs are clear to auscultation.      Cardiovascular: Normal rate, Regular rhythm.      Musculoskeletal: Normal gait.       Neurologic: Cranial nerves II through XII are grossly intact       Dix-Hallpike-Epley maneuver: positive  Assessment/Plan       Benign paroxysmal positional vertigo         Feel better after the Darlene Hallpike Epley maneuver.  Handouts given and discussed instructions of sleeping at 45  over the next 3-4 days and then 3-4 pillows for the next week.  Will follow-up if things are not improving.  Problem List/Past Medical History    Ongoing     Chronic headache disorder     Dysmenorrhea in the adolescent     History of chicken pox     POTS (postural orthostatic tachycardia syndrome)     Seasonal allergies     Sleep disorder    Historical  Medications    Adderall XR 20 mg oral capsule, extended release, 20 mg= 1 cap(s), po, qam    Adderall XR 20 mg oral capsule, extended release, 20 mg= 1 cap(s), po, qam    chamber spacer with mouthpiece for use with albuterol, See Instructions    Naprosyn 500 mg oral tablet, 500 mg= 1 tab(s), po, bid    ProAir HFA 90 mcg/inh inhalation aerosol, 2  puff(s), inh, q4-6 hrs, 2 refills    Quasense 0.15 mg-30 mcg oral tablet, 1 tab(s), po, daily, 4 refills    Wellbutrin  mg/24 hours oral tablet, extended release, 150 mg= 1 tab(s), po, q 24 hrs, 1 refills,   Not taking  Allergies    sulfa drug  Diagnostic Results   Declined UPT

## 2022-02-16 NOTE — PROGRESS NOTES
Patient:   YINA ARGUETA            MRN: [id number]            FIN: [id number]               Age:   17 years     Sex:  Female     :  2000   Associated Diagnoses:   Anxiety   Author:   April Fried      Visit Information      Primary Care Provider (PCP):  April Fried    NPI# [id number]      Chief Complaint   2017 10:18 AM CDT   f/u anxiety. Lexapro not helping anymore. Also concerns with jaw locking x 1 week.      History of Present Illness   PPC to discuss her anti-anxiety regimen  she has been using lexapro for the past several months but does not feel it is beneficial  she is not having mood labileness throughout the day which would make an intervention like buspar worth it, more generalized sadness and fatigue over past 3 months  PHQ9=19  GAD7= 12  Yina would like to change her medication    also, she has hx of TMJ, worse on right side but has not been taking anything for this  feels it crack and pull when opening mouth, used to see chiropractor who would massage the joint           Review of Systems   Constitutional:  Negative.    Ear/Nose/Mouth/Throat:  Negative except as documented in history of present illness.    Respiratory:  Negative.    Cardiovascular:  Negative.    Gastrointestinal:  Negative.    Hematology/Lymphatics:  Negative.    Immunologic:  Negative.    Musculoskeletal:  Negative.    Integumentary:  Negative.    Neurologic:  Negative.    Psychiatric:  Anxiety, Depression, No lurdes, Not suicidal, Not delusional, No hallucinations.       Health Status   Allergies:    Allergic Reactions (Selected)  Severity Not Documented  Sulfa drug (No reactions were documented)   Medications:  (Selected)   Prescriptions  Prescribed  Lexapro 20 mg oral tablet: 1 tab(s) ( 20 mg ), po, daily, # 90 tab(s), 1 Refill(s), Type: Maintenance, [id number], 1 tab(s) po daily,x90 day(s)  Naprosyn 500 mg oral tablet: 1 tab(s) ( 500 mg ), PO, BID, # 60 tab(s), 0  Refill(s), Type: Maintenance, Pharmacy: [id number], 1 tab(s) po bid  ProAir HFA 90 mcg/inh inhalation aerosol: 2 puff(s), inh, q4-6 hrs, # 1 EA, 2 Refill(s), Type: Maintenance, Pharmacy: [facility] [id number], 2 puff(s) inh q4-6 hrs  Quasense 0.15 mg-30 mcg oral tablet: 1 tab(s), po, daily, Instructions: Pt is due for an appt before further refills., # 91 tab(s), 4 Refill(s), Type: Maintenance, Pharmacy: [facility] [id number], 1 tab(s) po daily,x91 day(s),Instr:Pt is due for an appt before further refills.  chamber spacer with mouthpiece  for use with albuterol: chamber spacer with mouthpiece  for use with albuterol, See Instructions, Instructions: Use with albuterol., # 1 EA, 0 Refill(s), Type: Maintenance, Pharmacy: [facility] [id number]  sertraline 50 mg oral tablet: See Instructions, Instructions: take one daily for 1 week then increase to two, # 60 tab(s), 1 Refill(s), Type: Maintenance, Pharmacy: [facility] [id number], take one daily for 1 week then increase to two   Problem list:    All Problems  Chronic headache disorder / ICD-9-.0 / Confirmed  Dysmenorrhea in the Adolescent / ICD-9-.3 / Confirmed  History of chicken pox / SNOMED CT 132240270 / Confirmed  Pots (Postural Orthostatic Tachycardia Syndrome) / ICD-9-.0 / Confirmed  Pots (Postural Orthostatic Tachycardia Syndrome) / ICD-9-.0 / Confirmed  Sleep Disorder / ICD-9-.50 / Confirmed  Seasonal allergies / SNOMED CT 122567228 / Confirmed      Histories   Past Medical History:    Active  History of chicken pox (882729798): Onset in 2001 at 1 years.  Pots (Postural Orthostatic Tachycardia Syndrome) (785.0)  Seasonal allergies (674933230)   Family History:    Chronic pain syndrome  Mother (Dahlia Mcgraw)     Social History:        Alcohol Assessment            Never      Tobacco Assessment            Never            Never      Substance Abuse Assessment            Never      Employment and  Education Assessment            Student      Home and Environment Assessment            Marital status: Single.      Nutrition and Health Assessment            Type of diet: Regular.      Exercise and Physical Activity Assessment            Exercise frequency: 1-2 times/week.  Exercise type: Running.      Sexual Assessment            Sexually active: Yes.  Sexual orientation: Heterosexual.  Contraceptive Use Details: Birth control pill,               Condoms.        Physical Examination   Vital Signs   4/20/2017 10:18 AM CDT Peripheral Pulse Rate 72 bpm    Pulse Site Radial artery    HR Method Manual    Systolic Blood Pressure 114 mmHg    Diastolic Blood Pressure 72 mmHg    Mean Arterial Pressure 86 mmHg    BP Site Right arm    BP Method Manual      General:  Alert and oriented, No acute distress.    Eye:  Pupils are equal, round and reactive to light.    HENT:  Normocephalic, no swelling of TMJ, no audible crack or palpable crepitus.    Neck:  Supple.    Musculoskeletal:  Normal range of motion.    Integumentary:  Warm, Dry, Pink.    Neurologic:  Alert, Oriented, Normal sensory, No focal deficits.    Psychiatric:  Cooperative, Appropriate mood & affect, Normal judgment, Non-suicidal.       Impression and Plan   Diagnosis     Anxiety (KQV29-ZI F41.9).     Patient Instructions:       Counseled: Patient, Regarding diagnosis, Regarding treatment, Regarding medications, Activity, Verbalized understanding.    Summary:  1.  will start her on sertraline today and have her increase dose to 100mg in one week, if not improving can go up to 150mg in next 2 mos if not better may consider going to venlafaxine  2.  naprosyn for TMJ, discussed joint stretching and massage.    Orders     Orders (Selected)   Prescriptions  Prescribed  Naprosyn 500 mg oral tablet: 1 tab(s) ( 500 mg ), PO, BID, # 60 tab(s), 0 Refill(s), Type: Maintenance, Pharmacy: [facility] [id number], 1 tab(s) po bid  sertraline 50 mg oral tablet: See  Instructions, Instructions: take one daily for 1 week then increase to two, # 60 tab(s), 1 Refill(s), Type: Maintenance, Pharmacy: [facility] [id number], take one daily for 1 week then increase to two.

## 2022-02-16 NOTE — PROGRESS NOTES
Patient:   YINA ARGUETA            MRN: [id number]            FIN: [id number]               Age:   17 years     Sex:  Female     :  2000   Associated Diagnoses:   ADD (attention deficit disorder); Anxiety   Author:   April Fried      Visit Information      Primary Care Provider (PCP):  April Fried    NPI# [id number]      Chief Complaint   2017 10:57 AM CDT   f/u anxiety and ADD. Concerns with loss of sex drive with Zoloft. Wondering about Wellbutrin. Adderall going well. Refill naproxen.        History of Present Illness   17: PPC to readdress anxiety status, has been on sertraline for over one month, feels this has helped but not taken all symptoms away, has caused non existant sex life, she denies thoughts of suicide   she brings up ADD, was diagnosed with this as child although never treated, she felt this was more controlled in high school but now that she is in college and has a job she is having a harder time concentrating  [facility]: scored 96: high positive  she denies altered thought process   would like to try medication to help focus     17  PPC for refill of her adderall which she feels is working well, last refill per pdmp website 17, she does not take it every day and has more than a week left on current fill  she would like to have a different antidepressant as the zoloft is causing decreased libido      Review of Systems   Constitutional:  Negative.    Ear/Nose/Mouth/Throat:  Negative.    Respiratory:  Negative.    Cardiovascular:  Negative.    Gastrointestinal:  Negative.    Hematology/Lymphatics:  Negative.    Immunologic:  Negative.    Musculoskeletal:  Negative.    Integumentary:  Negative.    Neurologic:  Negative.    Psychiatric:  Anxiety, Depression, No lurdes, Not suicidal, Not delusional, No hallucinations.       Health Status   Allergies:    Allergic Reactions (Selected)  Severity Not Documented  Sulfa drug (No  reactions were documented)   Medications:  (Selected)   Prescriptions  Prescribed  Adderall XR 20 mg oral capsule, extended release: 1 cap(s) ( 20 mg ), PO, qAM, # 30 cap(s), 0 Refill(s), Type: Maintenance, Pharmacy: [place] [id number] [place] [facility], okay to fill after 7/26/17, 1 cap(s) po qam  Adderall XR 20 mg oral capsule, extended release: 1 cap(s) ( 20 mg ), PO, qAM, # 30 cap(s), 0 Refill(s), Type: Maintenance, Pharmacy: [place] [address] [place] [facility], okay to fill after 8/26/17, 1 cap(s) po qam  Effexor 75 mg oral tablet: 1 tab(s) ( 75 mg ), PO, BID, # 180 tab(s), 0 Refill(s), Type: Maintenance, Pharmacy: [place] [address] [place] [facility], 1 tab(s) po bid  Naprosyn 500 mg oral tablet: 1 tab(s) ( 500 mg ), PO, BID, # 60 tab(s), 0 Refill(s), Type: Maintenance, Pharmacy: [facility] [address], 1 tab(s) po bid  ProAir HFA 90 mcg/inh inhalation aerosol: 2 puff(s), inh, q4-6 hrs, # 1 EA, 2 Refill(s), Type: Maintenance, Pharmacy: [facility] [address], 2 puff(s) inh q4-6 hrs  Quasense 0.15 mg-30 mcg oral tablet: 1 tab(s), po, daily, Instructions: Pt is due for an appt before further refills., # 91 tab(s), 4 Refill(s), Type: Maintenance, Pharmacy: [facility] [address], 1 tab(s) po daily,x91 day(s),Instr:Pt is due for an appt before further refills.  chamber spacer with mouthpiece  for use with albuterol: chamber spacer with mouthpiece  for use with albuterol, See Instructions, Instructions: Use with albuterol., # 1 EA, 0 Refill(s), Type: Maintenance, Pharmacy: [facility] [address]  sertraline 100 mg oral tablet: 1.5 tab(s) ( 150 mg ), PO, Daily, # 135 tab(s), 1 Refill(s), Type: Maintenance, Pharmacy: [facility] [address], dose increase, 1.5 tab(s) po daily   Problem list:    All Problems (Selected)  Chronic headache disorder / SNOMED CT 5956752956 / Confirmed  Dysmenorrhea in the adolescent / SNOMED CT 496414419 / Confirmed  History of chicken pox / SNOMED CT 712414941 / Confirmed  POTS (postural orthostatic tachycardia  syndrome) / SNOMED CT 1057839576 / Confirmed  Seasonal allergies / SNOMED CT 674336441 / Confirmed  Sleep disorder / SNOMED CT 27603003 / Confirmed      Histories   Past Medical History:    Active  History of chicken pox (392334688): Onset in 2001 at 1 years.  POTS (postural orthostatic tachycardia syndrome) (8519649130)  Dysmenorrhea in the adolescent (055856008)  Sleep disorder (83268414)  Chronic headache disorder (7054053804)  Seasonal allergies (026055716)   Family History:    Chronic pain syndrome  Mother (Dahlia Mcgraw)     Social History:        Alcohol Assessment            Never      Tobacco Assessment            Never            Never      Substance Abuse Assessment            Never      Employment and Education Assessment            Student      Home and Environment Assessment            Marital status: Single.      Nutrition and Health Assessment            Type of diet: Regular.      Exercise and Physical Activity Assessment            Exercise frequency: 1-2 times/week.  Exercise type: Running.      Sexual Assessment            Sexually active: Yes.  Sexual orientation: Heterosexual.  Contraceptive Use Details: Birth control pill,               Condoms.        Physical Examination   Vital Signs   7/19/2017 10:57 AM CDT Peripheral Pulse Rate 76 bpm    Pulse Site Radial artery    HR Method Manual    Systolic Blood Pressure 122 mmHg    Diastolic Blood Pressure 76 mmHg    Mean Arterial Pressure 91 mmHg    BP Site Right arm    BP Method Manual      General:  Alert and oriented, No acute distress.    Eye:  Pupils are equal, round and reactive to light.    HENT:  Normocephalic.    Neck:  Supple.    Musculoskeletal:  Normal range of motion.    Integumentary:  Warm, Dry, Pink.    Neurologic:  Alert, Oriented, Normal sensory, No focal deficits.    Psychiatric:  Cooperative, Appropriate mood & affect, Normal judgment, Non-suicidal.       Impression and Plan   Diagnosis     ADD (attention deficit disorder)  (BIG20-SD F98.8).     Anxiety (GLF17-EE F41.9).     Patient Instructions:          Limitations: Alcohol consumption.         Counseled: Patient, Regarding diagnosis, Regarding treatment, Regarding medications, Verbalized understanding.    Summary:  discused SNRI/SSRI side effects, stop zoloft, will start effexor, if not tolerating please let me know  refilled adderall  will need annual exam after 8/2017, will need drug screen at her annual exam.    Orders     Orders (Selected)   Prescriptions  Prescribed  Adderall XR 20 mg oral capsule, extended release: 1 cap(s) ( 20 mg ), PO, qAM, # 30 cap(s), 0 Refill(s), Type: Maintenance, Pharmacy: [facility] [id number] [place] [facility], okay to fill after 7/26/17, 1 cap(s) po qam  Adderall XR 20 mg oral capsule, extended release: 1 cap(s) ( 20 mg ), PO, qAM, # 30 cap(s), 0 Refill(s), Type: Maintenance, Pharmacy: [facility] [address] [place] [facility], okay to fill after 8/26/17, 1 cap(s) po qam  Effexor 75 mg oral tablet: 1 tab(s) ( 75 mg ), PO, BID, # 180 tab(s), 0 Refill(s), Type: Maintenance, Pharmacy: [id number] [place], 1 tab(s) po bid.

## 2022-02-16 NOTE — NURSING NOTE
Comprehensive Intake Entered On:  4/19/2021 7:39 AM CDT    Performed On:  4/19/2021 7:38 AM CDT by Nathalie Patterson CMA               Summary   Chief Complaint :   ADHD med check-verbal consent obtained for a video visit.    Menstrual Status :   Menarcheal   Additonal Information :   Will be scheduling an annual px within 1-2 mo   Race :      Languages :   English   Ethnicity :   Not  or    Nathalie Patterson CMA - 4/19/2021 7:38 AM CDT   Health Status   Allergies Verified? :   Yes   Medication History Verified? :   Yes   Pre-Visit Planning Status :   Completed   Tobacco Use? :   Never smoker   Nathalie Patterson CMA - 4/19/2021 7:38 AM CDT   Consents   Consent for Immunization Exchange :   Consent Granted   Consent for Immunizations to Providers :   Consent Granted   Nathalie Patterson CMA - 4/19/2021 7:38 AM CDT   Meds / Allergies   (As Of: 4/19/2021 7:39:21 AM CDT)   Allergies (Active)   sulfa drug  Estimated Onset Date:   Unspecified ; Comments:     Comment 1: Mom and grandma highly allergic to sulfa   ; Created By:   Berta León; Reaction Status:   Active ; Category:   Drug ; Substance:   sulfa drug ; Type:   Allergy ; Updated By:   Berta León; Reviewed Date:   4/19/2021 7:39 AM CDT        Medication List   (As Of: 4/19/2021 7:39:21 AM CDT)   Prescription/Discharge Order    Miscellaneous Prescription  :   Miscellaneous Prescription ; Status:   Prescribed ; Ordered As Mnemonic:   chamber spacer with mouthpiece  for use with albuterol ; Simple Display Line:   See Instructions, Use with albuterol., 1 EA ; Ordering Provider:   Macy Garcia MD; Catalog Code:   Miscellaneous Prescription ; Order Dt/Tm:   9/20/2012 9:48:17 AM CDT          albuterol  :   albuterol ; Status:   Prescribed ; Ordered As Mnemonic:   ProAir HFA 90 mcg/inh inhalation aerosol ; Simple Display Line:   2 puff(s), inh, q4-6 hrs, 1 EA, 2 Refill(s) ; Ordering Provider:   Jackie Armijo MD; Catalog Code:   albuterol ; Order  Dt/Tm:   9/21/2015 5:20:08 PM CDT          amphetamine-dextroamphetamine  :   amphetamine-dextroamphetamine ; Status:   Prescribed ; Ordered As Mnemonic:   Adderall XR 20 mg oral capsule, extended release ; Simple Display Line:   20 mg, 1 cap(s), PO, qAM, fill in 30 days, 30 cap(s), 0 Refill(s) ; Ordering Provider:   Vielka Foster MD; Catalog Code:   amphetamine-dextroamphetamine ; Order Dt/Tm:   12/23/2020 12:09:49 PM CST          amphetamine-dextroamphetamine  :   amphetamine-dextroamphetamine ; Status:   Prescribed ; Ordered As Mnemonic:   Adderall XR 20 mg oral capsule, extended release ; Simple Display Line:   20 mg, 1 cap(s), PO, qAM, 30 cap(s), 0 Refill(s) ; Ordering Provider:   Vielka Foster MD; Catalog Code:   amphetamine-dextroamphetamine ; Order Dt/Tm:   12/29/2020 1:01:30 PM CST          ethinyl estradiol-levonorgestrel  :   ethinyl estradiol-levonorgestrel ; Status:   Prescribed ; Ordered As Mnemonic:   Introvale 0.15 mg 30 mcg oral tablet ; Simple Display Line:   1 tab(s), Oral, daily, 91 tab(s), 4 Refill(s) ; Ordering Provider:   Vielka Foster MD; Catalog Code:   ethinyl estradiol-levonorgestrel ; Order Dt/Tm:   4/14/2021 5:54:11 PM CDT          vilazodone  :   vilazodone ; Status:   Prescribed ; Ordered As Mnemonic:   Viibryd 20 mg oral tablet ; Simple Display Line:   20 mg, 1 tab(s), Oral, daily, 90 tab(s), 1 Refill(s) ; Ordering Provider:   Vielka Foster MD; Catalog Code:   vilazodone ; Order Dt/Tm:   4/14/2021 5:54:24 PM CDT            ID Risk Screen   Recent Travel History :   No recent travel   Family Member Travel History :   No recent travel   Other Exposure to Infectious Disease :   Unknown   COVID-19 Testing Status :   No positive COVID-19 test   Nathalie Patterson CMA - 4/19/2021 7:38 AM CDT

## 2022-02-16 NOTE — NURSING NOTE
Comprehensive Intake Entered On:  9/27/2021 12:31 PM CDT    Performed On:  9/27/2021 12:27 PM CDT by Celina Martinez CMA               Summary   Chief Complaint :   Patient presents for annual physical and ADHD medicaiton follow up.   Menstrual Status :   Menarcheal   Weight Measured :   158 lb(Converted to: 158 lb 0 oz, 71.668 kg)    Height Measured :   66.5 in(Converted to: 5 ft 6 in, 168.91 cm)    Body Mass Index :   25.12 kg/m2 (HI)    Body Surface Area :   1.83 m2   Systolic Blood Pressure :   130 mmHg   Diastolic Blood Pressure :   70 mmHg   Mean Arterial Pressure :   90 mmHg   Peripheral Pulse Rate :   106 bpm (HI)    BP Site :   Right arm   BP Method :   Manual   HR Method :   Electronic   Temperature Tympanic :   97.8 DegF(Converted to: 36.6 DegC)  (LOW)    Oxygen Saturation :   98 %   Race :      Languages :   English   Ethnicity :   Not  or    Celina Martinez CMA - 9/27/2021 12:27 PM CDT   Health Status   Allergies Verified? :   Yes   Medication History Verified? :   Yes   Tobacco Use? :   Never smoker   Celina Martinez CMA - 9/27/2021 12:27 PM CDT   Consents   Consent for Immunization Exchange :   Consent Granted   Consent for Immunizations to Providers :   Consent Granted   Celina Martinez CMA - 9/27/2021 12:27 PM CDT   Meds / Allergies   (As Of: 9/27/2021 12:31:47 PM CDT)   Allergies (Active)   sulfa drug  Estimated Onset Date:   Unspecified ; Comments:     Comment 1: Mom and grandma highly allergic to sulfa   ; Created By:   Berta León; Reaction Status:   Active ; Category:   Drug ; Substance:   sulfa drug ; Type:   Allergy ; Updated By:   Berta León; Reviewed Date:   9/27/2021 12:28 PM CDT        Medication List   (As Of: 9/27/2021 12:31:47 PM CDT)   Prescription/Discharge Order    albuterol  :   albuterol ; Status:   Prescribed ; Ordered As Mnemonic:   ProAir HFA 90 mcg/inh inhalation aerosol ; Simple Display Line:   2 puff(s), inh, q4-6 hrs, 1 EA, 2 Refill(s)  ; Ordering Provider:   Jackie Armijo MD; Catalog Code:   albuterol ; Order Dt/Tm:   9/21/2015 5:20:08 PM CDT          amphetamine-dextroamphetamine  :   amphetamine-dextroamphetamine ; Status:   Prescribed ; Ordered As Mnemonic:   Adderall XR 20 mg oral capsule, extended release ; Simple Display Line:   20 mg, 1 cap(s), PO, qAM, 30 cap(s), 0 Refill(s) ; Ordering Provider:   Judd Tong PA-C; Catalog Code:   amphetamine-dextroamphetamine ; Order Dt/Tm:   7/20/2021 12:50:07 PM CDT          amphetamine-dextroamphetamine  :   amphetamine-dextroamphetamine ; Status:   Prescribed ; Ordered As Mnemonic:   Adderall XR 20 mg oral capsule, extended release ; Simple Display Line:   20 mg, 1 cap(s), PO, qAM, fill in 30 days, 30 cap(s), 0 Refill(s) ; Ordering Provider:   Vielka Foster MD; Catalog Code:   amphetamine-dextroamphetamine ; Order Dt/Tm:   4/19/2021 8:04:46 AM CDT          ethinyl estradiol-levonorgestrel  :   ethinyl estradiol-levonorgestrel ; Status:   Prescribed ; Ordered As Mnemonic:   Introvale 0.15 mg 30 mcg oral tablet ; Simple Display Line:   1 tab(s), Oral, daily, 91 tab(s), 4 Refill(s) ; Ordering Provider:   Vielka Foster MD; Catalog Code:   ethinyl estradiol-levonorgestrel ; Order Dt/Tm:   4/14/2021 5:54:11 PM CDT          Miscellaneous Prescription  :   Miscellaneous Prescription ; Status:   Prescribed ; Ordered As Mnemonic:   chamber spacer with mouthpiece  for use with albuterol ; Simple Display Line:   See Instructions, Use with albuterol., 1 EA ; Ordering Provider:   Macy Garcia MD; Catalog Code:   Miscellaneous Prescription ; Order Dt/Tm:   9/20/2012 9:48:17 AM CDT          vilazodone  :   vilazodone ; Status:   Prescribed ; Ordered As Mnemonic:   Viibryd 20 mg oral tablet ; Simple Display Line:   20 mg, 1 tab(s), Oral, daily, 90 tab(s), 1 Refill(s) ; Ordering Provider:   Vielka Foster MD; Catalog Code:   vilazodone ; Order Dt/Tm:   4/14/2021 5:54:24 PM CDT

## 2022-02-16 NOTE — TELEPHONE ENCOUNTER
---------------------  From: Nanette Messina   To: [employer] [facility] ([id number]);     Sent: 8/12/2021 5:14:04 PM CDT  Subject: Scheduling Management     Pt no showed for physical appt today 8.12.21 / Reason: Annual Px and med refill

## 2022-02-16 NOTE — PROGRESS NOTES
Patient:   YINA ARGUETA            MRN: [id number]            FIN: [id number]               Age:   17 years     Sex:  Female     :  2000   Associated Diagnoses:   Right shoulder pain; Shoulder Joint Hypermobility   Author:   Judd Tong PA-C      Chief Complaint   3/18/2017 11:27 AM CDT   Pt here for right shoulder pain. Pt thinks it's dislocated.      History of Present Illness   Chief complaint and symptoms noted above and confirmed with patient   thinks she dislocated her right shoulder last night rough housing with her boyfriend  pain is less today, but ROM is limited  took some advil last night      Health Status   Allergies:    Allergic Reactions (Selected)  Severity Not Documented  Sulfa drug (No reactions were documented)   Medications:  (Selected)   Prescriptions  Prescribed  Lexapro 20 mg oral tablet: 1 tab(s) ( 20 mg ), po, daily, # 90 tab(s), 1 Refill(s), Type: Maintenance, Pharmacy: [facility] [id number], 1 tab(s) po daily,x90 day(s)  ProAir HFA 90 mcg/inh inhalation aerosol: 2 puff(s), inh, q4-6 hrs, # 1 EA, 2 Refill(s), Type: Maintenance, Pharmacy: [facility] [id number], 2 puff(s) inh q4-6 hrs  Quasense 0.15 mg-30 mcg oral tablet: 1 tab(s), po, daily, Instructions: Pt is due for an appt before further refills., # 91 tab(s), 4 Refill(s), Type: Maintenance, Pharmacy: [facility] [id number], 1 tab(s) po daily,x91 day(s),Instr:Pt is due for an appt before further refills.  chamber spacer with mouthpiece  for use with albuterol: chamber spacer with mouthpiece  for use with albuterol, See Instructions, Instructions: Use with albuterol., # 1 EA, 0 Refill(s), Type: Maintenance, Pharmacy: [facility] [id number]   Problem list:    All Problems  History of chicken pox / SNOMED CT 676956805 / Confirmed  Seasonal allergies / SNOMED CT 440775907 / Confirmed  Dysmenorrhea in the Adolescent / ICD-9-.3 / Confirmed  Sleep Disorder / ICD-9-.50 / Confirmed  Chronic headache  disorder / ICD-9-.0 / Confirmed  Pots (Postural Orthostatic Tachycardia Syndrome) / ICD-9-.0 / Confirmed  Pots (Postural Orthostatic Tachycardia Syndrome) / ICD-9-.0 / Confirmed      Histories   Past Medical History:    Active  History of chicken pox (616285327): Onset in 2001 at 1 years.  Pots (Postural Orthostatic Tachycardia Syndrome) (785.0)  Seasonal allergies (112153028)   Family History:    Chronic pain syndrome  Mother (Dahlia Mcgraw)     Procedure history:    Esophagogastroduodenoscopy (896632271) on 6/13/2014 at 14 Years.  Comments:  6/17/2014 2:48 PM - Amita SIMMONS, Irma  Sedation: MAC  Indication: epigastric abdominal pain  Duodenum biopsy: small bowel mucosa without abnormality, no evidence of celiac  Stomach, antrum biopsy: gastric antral mucosa with no abnormalities, no helicobacter organisms identified  Clotest negative      Physical Examination   Vital Signs   3/18/2017 11:27 AM CDT Peripheral Pulse Rate 74 bpm    Pulse Site Radial artery    Systolic Blood Pressure 108 mmHg    Diastolic Blood Pressure 72 mmHg    Mean Arterial Pressure 84 mmHg    BP Site Right arm      General:  No acute distress.    Musculoskeletal:  right shoulder: good active ROM, good  strength, good resisted suppination/pronation, good resisted flexion/extension, good resisted external rotation but painful, has pain with full can test but has reasonable strength ,  impingement test is positive, no crepitus with passive ROM; there is pain over the posterior shoulder.       Review / Management   Radiology results   Results reviewed with patient.  Xray shows no fractures or dislocations.  Will be over-read by radiologist.  Will call if over-read has additional information.      Impression and Plan   Diagnosis     Right shoulder pain (TDS45-AJ M25.511).     Shoulder Joint Hypermobility (YEY77-DU M24.819).     Summary:  will have see PT for shoulder stabilization.    Orders     Orders   Requests  (Radiology):  XR Shoulder Right (Request) (Order): Right shoulder pain.     Orders   Requests (Procedures):  Physical Therapy (Request) (Order): Shoulder Joint Hypermobility  Right shoulder pain.     Orders   Charges (Evaluation and Management):  09150 office outpatient visit 15 minutes (Charge) (Order): Quantity: 1, Right shoulder pain  Shoulder Joint Hypermobility.

## 2022-02-16 NOTE — TELEPHONE ENCOUNTER
---------------------  From: Michelle Guillen LPN (Phone Messages Pool ([id number]_[facility]))   To: Advanced Practice Provider Pool ([id number]_[facility]);     Sent: 7/20/2021 11:49:57 AM CDT  Subject: Adderall     Phone Message    PCP:   none asked for KWL      Time of Call:  11:40am       Person Calling:  pt  Phone number:  [phone]    Returned call at: _    Note:   Pt calling asking for Adderall refill. Pt says she scheduled an appt for 8/4 but is out.  Pt asking if she can at least get a partial fill.    Last Rx 4/19/21 Adderall XR 20mg 1 cap PO qAM- 2 Rx's given this day  RTC placed 4/19 to RTC 4 months for ADHD and in June 2021 for physical    Last office visit and reason:  4/19/21 Video Visit- ADD---------------------  From: Ran IBANEZ, Judd STANFORD (Advanced Practice Provider Pool ([id number]_[facility]))   To: Phone Messages Pool ([id number]_[place]);     Sent: 7/20/2021 12:51:05 PM CDT  Subject: RE: Adderall     Rx filled for one month, [place] PDMP consulted, no irregularities notedCalled pt at 1253 and informed her of refill

## 2022-02-16 NOTE — LETTER
(Inserted Image. Unable to display)   [address] [place] [address]  [phone] (phone) [phone] (fax)  October 01, 2021      YINA MART      [address] [place] [address] DR KITTY CHAMBERS [address] [place] [id number]      Dear YINA,    Thank you for selecting [facility] for your healthcare needs. Below you will find the results of the recent tests done at our clinic.     Normal pap smear.  This can be repeated every three years. We will see you in one year for an annual exam.    Result Name Current Result   ThinPrep PAP with HPV   9/27/2021       Please contact me or my assistant at [phone] if you have any questions or concerns.     Sincerely,        Vielka Foster M.D.

## 2022-02-16 NOTE — PROGRESS NOTES
Chief Complaint    Skin concern on back of left leg.  Concern for ingrown hair.  History of Present Illness      Chief complaint as above reviewed and confirmed with patient.  Pt presents to the clinic with concerns re: L posterior thigh redness, swelling, pain.  She has a hx of MRSA.  Has had similar lesions that required I and D in the past but states this has been less severe this time.  No fevers.  She is in town only for a short weekend and returning to college in [place] tomorrow.  Review of Systems      Review of systems is negative with the exception of those noted in HPI          Physical Exam   Vitals & Measurements    T: 98.3   F (Tympanic)  HR: 89(Peripheral)  BP: 116/70  SpO2: 98%     WT: 140.4 lb       exam of the L posteror thigh reveals small open papule that is actively draining (new since bandage taken off this am) purulent yellow discharge that was able to be easily expressed.  There is surrounding pink  erythema of approximately 4 cm in diameter.  No warmth.  No flucuant mass, just slight induration of about 1 cm present.  Assessment/Plan       1. Abscess of left leg (L02.416)         it is actively draining and mild surrounding cellulitis as well as small at this time, pt opted not to do formal I and D at this time but if worsening will return.  doxycycline as ordered.  warm compresses.  she has several of these over the last few years  Bactroban nasally for the next 10 days.  fu in [place] for worsening sx when she returns tomorrow.  will call with culture results.       Orders:         doxycycline, = 1 cap(s) ( 100 mg ), Oral, bid, x 10 day(s), # 20 cap(s), 0 Refill(s), Type: Acute, Pharmacy: [id number], 1 cap(s) Oral bid,x10 day(s), (Ordered)         mupirocin topical, 1 flavio, TOP, TID, x 10 day(s), # 22 gm, 1 Refill(s), Type: Acute, Pharmacy: [facility], 1 flavio Topical tid,x10 day(s), (Ordered)         Culture, Aerobic Bacteria* ([facility]), Specimen Type: Wound,  Collection Date: 04/13/19 8:37:00 CDT  Patient Information     Name:YINA ARGUETA      Address:      [address] [place] [id number]     Sex:Female     YOB: 2000     [phone]     Emergency Contact:RUBINA MART     MRN:[id number]     FIN:[id number]     [facility]     Date of Service:04/13/2019      Primary Care Physician:       NONE ,       Attending Physician:       Darnell IBANEZ, Delilah PAULINO, [phone]  Problem List/Past Medical History    Ongoing     Chronic headache disorder     Dysmenorrhea in the adolescent     History of chicken pox     POTS (postural orthostatic tachycardia syndrome)     Seasonal allergies     Sleep disorder    Historical     No qualifying data  Procedure/Surgical History     Esophagogastroduodenoscopy (06/13/2014)            Comments: Sedation: MAC      Indication: epigastric abdominal pain      Duodenum biopsy: small bowel mucosa without abnormality, no evidence of celiac      Stomach, antrum biopsy: gastric antral mucosa with no abnormalities, no helicobacter organisms identified      Clotest negative.  Medications     chamber spacer with mouthpiece  for use with albuterol: See Instructions, Use with albuterol., 1 EA.     ProAir HFA 90 mcg/inh inhalation aerosol: 2 puff(s), inh, q4-6 hrs, 1 EA, 2 Refill(s).     Naprosyn 500 mg oral tablet: 500 mg, 1 tab(s), PO, BID, 60 tab(s), 0 Refill(s).     Adderall XR 20 mg oral capsule, extended release: 20 mg, 1 cap(s), PO, qAM, 30 cap(s), 0 Refill(s).     Quasense 0.15 mg-30 mcg oral tablet: 1 tab(s), po, daily, for 30 day(s), 30 tab(s), 0 Refill(s).     doxycycline monohydrate 100 mg oral capsule: 100 mg, 1 cap(s), Oral, bid, for 10 day(s), 20 cap(s), 0 Refill(s).     mupirocin 2% topical ointment: 1 flavio, TOP, TID, for 10 day(s), 22 gm, 1 Refill(s).          Allergies    sulfa drug  Social History    Smoking Status - 04/13/2019     Never smoker     Alcohol       Never, 06/08/2015     Employment and Education      Student, 06/08/2015     Exercise and Physical Activity      Exercise frequency: 1-2 times/week. Exercise type: Running., 06/08/2015     Home and Environment      Marital status: Single., 06/08/2015     Nutrition and Health      Type of diet: Regular., 06/08/2015     Sexual      Sexually active: Yes. Sexual orientation: Heterosexual. Contraceptive Use Details: Birth control pill, Condoms., 06/08/2015     Substance Abuse      Never, 06/08/2015     Tobacco      Never, 11/20/2013  Family History    Chronic pain syndrome: Mother.  Immunizations      Vaccine Date Status      meningococcal group B vaccine 10/21/2016 Given      meningococcal conjugate vaccine 06/03/2015 Given      human papillomavirus vaccine 06/30/2014 Given      Hep A, pediatric/adolescent 06/30/2014 Given      human papillomavirus vaccine 05/13/2013 Given      human papillomavirus vaccine 11/30/2012 Given      influenza virus vaccine, inactivated 09/20/2012 Given      human papillomavirus vaccine 08/07/2012 Given      Hep A, pediatric/adolescent 08/07/2012 Given      influenza (LAIV) 08/31/2010 Given      influenza, H1N1, inactivated 12/15/2009 Recorded      meningococcal conjugate vaccine 07/13/2009 Recorded      tetanus/diphth/pertuss (Tdap) adult/adol 07/13/2009 Recorded      tetanus/diphth/pertuss (Tdap) adult/adol 07/13/2009 Recorded      influenza 12/06/2002 Recorded      DTaP 10/22/2002 Recorded      influenza 10/22/2002 Recorded      MMR (measles/mumps/rubella) 10/22/2002 Recorded      IPV 10/22/2002 Recorded      DTaP 07/16/1999 Recorded      Hib (PRP-T) 07/16/1999 Recorded      MMR (measles/mumps/rubella) 07/16/1999 Recorded      IPV 07/16/1999 Recorded      Hep B 01/08/1999 Recorded      DTaP 1998 Recorded      Hib (PRP-T) 1998 Recorded      DTaP 1998 Recorded      Hib (PRP-T) 1998 Recorded      IPV 1998 Recorded      DTaP 1998 Recorded      Hep B  1998 Recorded      Hib (PRP-T) 1998 Recorded      IPV 1998 Recorded      Hep B 1998 Recorded      varicella - Not Given      varicella - Not Given      pneumococcal (PCV7) - Not Given      pneumococcal (PCV7) - Not Given      pneumococcal (PCV7) - Not Given      pneumococcal (PCV7) - Not Given      rotavirus vaccine - Not Given      rotavirus vaccine - Not Given      rotavirus vaccine - Not Given

## 2022-02-16 NOTE — PROCEDURES
Accession Number:       [id number]  CLINICAL INFORMATION::     None given  LMP::     NONE GIVEN  PREV. PAP::     NONE GIVEN  PREV. BX::     NONE GIVEN  SOURCE::     [facility]  STATEMENT OF ADEQUACY::     Satisfactory for evaluation. Endocervical/transformation zone component present. Age and/or menstrual status not provided  INTERPRETATION/RESULT::     Negative for intraepithelial lesion or malignancy.  COMMENT::     This Pap test has been evaluated with computer assisted technology.  CYTOTECHNOLOGIST::     RASHMI RENDON(ASCP) CT Screening location: [place] [id number]  COMMENT:     See comment       EXPLANATORY NOTE:         The Pap is a screening test for cervical cancer. It is       not a diagnostic test and is subject to false negative       and false positive results. It is most reliable when a       satisfactory sample, regularly obtained, is submitted       with relevant clinical findings and history, and when       the Pap result is evaluated along with historic and       current clinical information.

## 2022-03-02 NOTE — TELEPHONE ENCOUNTER
---------------------  From: Ryanne Carreno RN (Phone Messages Pool ([id number]_[facility]))   To: Advanced Practice Provider [place] ([id number]_[facility]);     Sent: 1/11/2022 2:58:21 PM CST  Subject: Adderall refill       PCP:  MARU o/c      Time of Call:  2:50pm       Person Calling:  pt  Phone number:  [phone]    Note:   Pt called in, requesting refill of Adderall to be sent into [facility]. Pt stated she is starting school and will call back and schedule an appt for ADHD.     Adderall XR 25mg 1 cap QAM #30 on 11/5/21 to fill in 30 days    Please advise.     Last office visit and reason:  9/27/21 annual/med refills KWL---------------------  From: Ran IBANEZ, Judd STANFORD (Advanced Practice Provider Pool ([id number]_[facility]))   To: Phone Messages Pool ([id number]_[place]);     Sent: 1/11/2022 3:32:49 PM CST  Subject: RE: Adderall refill     Rx filled for a month, [place] PDMP consulted, no irregularities noted  needs appt with MARU for further refillsLM on identified voicemail letting pt know Rx sent. Told pt she needs follow up with ARIANAL for further refills.

## 2022-03-16 PROBLEM — N94.6 DYSMENORRHEA: Status: ACTIVE | Noted: 2022-03-16

## 2022-03-16 PROBLEM — J30.2 SEASONAL ALLERGIC RHINITIS: Status: ACTIVE | Noted: 2022-03-16

## 2022-03-16 PROBLEM — G90.A POSTURAL ORTHOSTATIC TACHYCARDIA SYNDROME: Status: ACTIVE | Noted: 2022-03-16

## 2022-03-16 PROBLEM — F32.A MODERATE DEPRESSIVE EPISODE: Status: ACTIVE | Noted: 2022-03-16

## 2022-03-16 PROBLEM — G89.29 CHRONIC HEADACHE DISORDER: Status: ACTIVE | Noted: 2022-03-16

## 2022-03-16 PROBLEM — F90.0 ATTENTION DEFICIT HYPERACTIVITY DISORDER (ADHD), PREDOMINANTLY INATTENTIVE TYPE: Status: ACTIVE | Noted: 2022-03-16

## 2022-03-16 PROBLEM — R51.9 CHRONIC HEADACHE DISORDER: Status: ACTIVE | Noted: 2022-03-16

## 2022-03-16 PROBLEM — G47.9 SLEEP DISORDER: Status: ACTIVE | Noted: 2022-03-16

## 2022-03-16 PROBLEM — Z86.19 HISTORY OF VARICELLA: Status: ACTIVE | Noted: 2022-03-16

## 2022-03-16 RX ORDER — IBUPROFEN 200 MG
600 TABLET ORAL 4 TIMES DAILY PRN
COMMUNITY
End: 2022-03-17

## 2022-03-16 RX ORDER — ALBUTEROL SULFATE 90 UG/1
2 AEROSOL, METERED RESPIRATORY (INHALATION) EVERY 4 HOURS PRN
COMMUNITY
Start: 2021-09-27 | End: 2023-03-22

## 2022-03-16 RX ORDER — CALCIUM CARBONATE 500 MG/1
500 TABLET, CHEWABLE ORAL 3 TIMES DAILY PRN
COMMUNITY
End: 2022-03-17

## 2022-03-16 RX ORDER — DEXTROAMPHETAMINE SACCHARATE, AMPHETAMINE ASPARTATE MONOHYDRATE, DEXTROAMPHETAMINE SULFATE AND AMPHETAMINE SULFATE 6.25; 6.25; 6.25; 6.25 MG/1; MG/1; MG/1; MG/1
25 CAPSULE, EXTENDED RELEASE ORAL DAILY
COMMUNITY
Start: 2021-11-05 | End: 2022-03-17

## 2022-03-16 RX ORDER — VILAZODONE HYDROCHLORIDE 20 MG/1
20 TABLET ORAL DAILY
COMMUNITY
Start: 2021-09-27 | End: 2022-03-17

## 2022-03-16 RX ORDER — LEVONORGESTREL AND ETHINYL ESTRADIOL 0.15-0.03
1 KIT ORAL DAILY
COMMUNITY
Start: 2021-09-27 | End: 2022-03-17

## 2022-03-17 ENCOUNTER — OFFICE VISIT (OUTPATIENT)
Dept: FAMILY MEDICINE | Facility: CLINIC | Age: 22
End: 2022-03-17
Payer: COMMERCIAL

## 2022-03-17 VITALS
WEIGHT: 152.7 LBS | BODY MASS INDEX: 23.97 KG/M2 | HEIGHT: 67 IN | TEMPERATURE: 98.9 F | HEART RATE: 78 BPM | SYSTOLIC BLOOD PRESSURE: 120 MMHG | DIASTOLIC BLOOD PRESSURE: 66 MMHG | OXYGEN SATURATION: 99 %

## 2022-03-17 DIAGNOSIS — N94.6 DYSMENORRHEA: Primary | ICD-10-CM

## 2022-03-17 DIAGNOSIS — F90.0 ATTENTION DEFICIT HYPERACTIVITY DISORDER (ADHD), PREDOMINANTLY INATTENTIVE TYPE: ICD-10-CM

## 2022-03-17 PROCEDURE — 99213 OFFICE O/P EST LOW 20 MIN: CPT | Performed by: FAMILY MEDICINE

## 2022-03-17 RX ORDER — LEVONORGESTREL AND ETHINYL ESTRADIOL 0.15-0.03
1 KIT ORAL DAILY
Qty: 84 TABLET | Refills: 4 | Status: SHIPPED | OUTPATIENT
Start: 2022-03-17 | End: 2023-02-24

## 2022-03-17 RX ORDER — DEXTROAMPHETAMINE SACCHARATE, AMPHETAMINE ASPARTATE MONOHYDRATE, DEXTROAMPHETAMINE SULFATE AND AMPHETAMINE SULFATE 6.25; 6.25; 6.25; 6.25 MG/1; MG/1; MG/1; MG/1
25 CAPSULE, EXTENDED RELEASE ORAL DAILY
Qty: 30 CAPSULE | Refills: 0 | Status: SHIPPED | OUTPATIENT
Start: 2022-03-17 | End: 2022-04-07

## 2022-03-17 RX ORDER — DEXTROAMPHETAMINE SACCHARATE, AMPHETAMINE ASPARTATE MONOHYDRATE, DEXTROAMPHETAMINE SULFATE AND AMPHETAMINE SULFATE 6.25; 6.25; 6.25; 6.25 MG/1; MG/1; MG/1; MG/1
25 CAPSULE, EXTENDED RELEASE ORAL DAILY
Qty: 30 CAPSULE | Refills: 0 | Status: SHIPPED | OUTPATIENT
Start: 2022-04-14 | End: 2023-02-24

## 2022-03-17 ASSESSMENT — PATIENT HEALTH QUESTIONNAIRE - PHQ9
10. IF YOU CHECKED OFF ANY PROBLEMS, HOW DIFFICULT HAVE THESE PROBLEMS MADE IT FOR YOU TO DO YOUR WORK, TAKE CARE OF THINGS AT HOME, OR GET ALONG WITH OTHER PEOPLE: VERY DIFFICULT
SUM OF ALL RESPONSES TO PHQ QUESTIONS 1-9: 9
SUM OF ALL RESPONSES TO PHQ QUESTIONS 1-9: 9

## 2022-03-17 NOTE — PROGRESS NOTES
Assessment & Plan     Dysmenorrhea  We will continue on birth control pills.  Next annual exam due this fall.  Pap is up-to-date  - levonorgestrel-ethinyl estradiol (INTROVALE) 0.15-0.03 MG tablet; Take 1 tablet by mouth daily    Attention deficit hyperactivity disorder (ADHD), predominantly inattentive type  Patient is well controlled on Adderall.  Continue every 4 month follow-up  - amphetamine-dextroamphetamine (ADDERALL XR) 25 MG 24 hr capsule; Take 1 capsule (25 mg) by mouth daily  - amphetamine-dextroamphetamine (ADDERALL XR) 25 MG 24 hr capsule; Take 1 capsule (25 mg) by mouth daily                 Return in about 4 months (around 7/17/2022) for adhd follow up (can be virtual as long as she is located in [place]).    Vielka Foster MD  [facility]    Toribio Wilkerson is a 22 year old who presents for the following health issues     Here for ADHD follow-up current regimen is working well.  Patient is not noticing any side effects.  Mood has been stable.  Has not noticed any irritability or any disruptions in sleep.  No changes in appetite.  Weight is down very slightly but still well within healthy range.  Discussed PHQ-9.  Patient reports due to increased stress.  No change in regimen or treatment at this time    A.D.H.D    Healthy Habits:     Getting at least 3 servings of Calcium per day:  Yes    Bi-annual eye exam:  Yes    Dental care twice a year:  NO    Sleep apnea or symptoms of sleep apnea:  None    Diet:  Regular (no restrictions)    Frequency of exercise:  1 day/week    Duration of exercise:  30-45 minutes    Taking medications regularly:  No    Barriers to taking medications:  Problems remembering to take them    Medication side effects:  Not applicable    PHQ-2 Total Score: 4    Additional concerns today:  No           ADHD medication check and refills      How many servings of fruits and vegetables do you eat daily?  2-3    On average, how many sweetened beverages do  "you drink each day (Examples: soda, juice, sweet tea, etc.  Do NOT count diet or artificially sweetened beverages)?   2    How many days per week do you exercise enough to make your heart beat faster? 3 or less    How many minutes a day do you exercise enough to make your heart beat faster? 10 - 19    How many days per week do you miss taking your medication? 0      Review of Systems         Objective    /66 (BP Location: Right arm)   Pulse 78   Temp 98.9  F (37.2  C) (Tympanic)   Ht 1.689 m (5' 6.5\")   Wt 69.3 kg (152 lb 11.2 oz)   SpO2 99%   BMI 24.28 kg/m    Body mass index is 24.28 kg/m .  Physical Exam     Alert cooperative in no acute distress  Affect and mood are within normal limits, normal insight and judgment                Answers for HPI/ROS submitted by the patient on 3/17/2022  If you checked off any problems, how difficult have these problems made it for you to do your work, take care of things at home, or get along with other people?: Very difficult  PHQ9 TOTAL SCORE: 9      "

## 2022-03-18 ASSESSMENT — PATIENT HEALTH QUESTIONNAIRE - PHQ9: SUM OF ALL RESPONSES TO PHQ QUESTIONS 1-9: 9

## 2022-03-26 ENCOUNTER — HEALTH MAINTENANCE LETTER (OUTPATIENT)
Age: 22
End: 2022-03-26

## 2022-04-07 ENCOUNTER — OFFICE VISIT (OUTPATIENT)
Dept: FAMILY MEDICINE | Facility: CLINIC | Age: 22
End: 2022-04-07
Payer: COMMERCIAL

## 2022-04-07 VITALS
HEART RATE: 80 BPM | BODY MASS INDEX: 24.14 KG/M2 | OXYGEN SATURATION: 97 % | HEIGHT: 67 IN | SYSTOLIC BLOOD PRESSURE: 118 MMHG | DIASTOLIC BLOOD PRESSURE: 72 MMHG | WEIGHT: 153.8 LBS | TEMPERATURE: 98.9 F

## 2022-04-07 DIAGNOSIS — Z01.818 PREOP GENERAL PHYSICAL EXAM: ICD-10-CM

## 2022-04-07 DIAGNOSIS — Z30.8 ENCOUNTER FOR OTHER CONTRACEPTIVE MANAGEMENT: Primary | ICD-10-CM

## 2022-04-07 DIAGNOSIS — F90.0 ATTENTION DEFICIT HYPERACTIVITY DISORDER (ADHD), PREDOMINANTLY INATTENTIVE TYPE: ICD-10-CM

## 2022-04-07 PROBLEM — G90.A POSTURAL ORTHOSTATIC TACHYCARDIA SYNDROME: Status: RESOLVED | Noted: 2022-03-16 | Resolved: 2022-04-07

## 2022-04-07 PROBLEM — N94.6 DYSMENORRHEA: Status: RESOLVED | Noted: 2022-03-16 | Resolved: 2022-04-07

## 2022-04-07 PROBLEM — G47.9 SLEEP DISORDER: Status: RESOLVED | Noted: 2022-03-16 | Resolved: 2022-04-07

## 2022-04-07 PROBLEM — Z15.89 MTHFR MUTATION: Status: ACTIVE | Noted: 2018-06-15

## 2022-04-07 LAB
ERYTHROCYTE [DISTWIDTH] IN BLOOD BY AUTOMATED COUNT: 12.1 % (ref 10–15)
HCT VFR BLD AUTO: 41.1 % (ref 35–47)
HGB BLD-MCNC: 13.2 G/DL (ref 11.7–15.7)
MCH RBC QN AUTO: 29.7 PG (ref 26.5–33)
MCHC RBC AUTO-ENTMCNC: 32.1 G/DL (ref 31.5–36.5)
MCV RBC AUTO: 93 FL (ref 78–100)
PLATELET # BLD AUTO: 298 10E3/UL (ref 150–450)
RBC # BLD AUTO: 4.44 10E6/UL (ref 3.8–5.2)
WBC # BLD AUTO: 10.1 10E3/UL (ref 4–11)

## 2022-04-07 PROCEDURE — 36415 COLL VENOUS BLD VENIPUNCTURE: CPT | Performed by: FAMILY MEDICINE

## 2022-04-07 PROCEDURE — 85027 COMPLETE CBC AUTOMATED: CPT | Performed by: FAMILY MEDICINE

## 2022-04-07 PROCEDURE — 99214 OFFICE O/P EST MOD 30 MIN: CPT | Performed by: FAMILY MEDICINE

## 2022-04-07 RX ORDER — NICOTINE POLACRILEX 4 MG/1
20 GUM, CHEWING ORAL DAILY
COMMUNITY

## 2022-04-07 RX ORDER — DEXTROAMPHETAMINE SACCHARATE, AMPHETAMINE ASPARTATE MONOHYDRATE, DEXTROAMPHETAMINE SULFATE AND AMPHETAMINE SULFATE 6.25; 6.25; 6.25; 6.25 MG/1; MG/1; MG/1; MG/1
25 CAPSULE, EXTENDED RELEASE ORAL DAILY
Qty: 30 CAPSULE | Refills: 0 | Status: SHIPPED | OUTPATIENT
Start: 2022-05-12 | End: 2023-03-10

## 2022-04-07 ASSESSMENT — ENCOUNTER SYMPTOMS
CONSTITUTIONAL NEGATIVE: 1
PSYCHIATRIC NEGATIVE: 1
RESPIRATORY NEGATIVE: 1
GASTROINTESTINAL NEGATIVE: 1
NEUROLOGICAL NEGATIVE: 1
ENDOCRINE NEGATIVE: 1
CARDIOVASCULAR NEGATIVE: 1
MUSCULOSKELETAL NEGATIVE: 1
EYES NEGATIVE: 1
HEMATOLOGIC/LYMPHATIC NEGATIVE: 1

## 2022-04-07 NOTE — PROGRESS NOTES
[address] [address]  Phone: [phone]  Fax: [phone]  Primary Provider: Vielka Foster W    :[id number]}  PREOPERATIVE EVALUATION:  Today's date: 4/7/2022    Rhea Mcgraw is a 22 year old female who presents for a preoperative evaluation.    Surgical Information:  Surgery/Procedure: bilateral laparoscopic salpingectomy for contraception  Surgery Location:  Same day surgery center   Surgeon: Dr. Block  Surgery Date: 5/5/22  Time of Surgery:   Where patient plans to recover: At home with family  Fax number for surgical facility [phone]    Type of Anesthesia Anticipated: General    Assessment & Plan     The proposed surgical procedure is considered INTERMEDIATE risk.    Encounter for other contraceptive management  Patient desires permanent contraception, understands and agrees to procedure, denies any questions    Preop general physical exam  Appropriate to proceed with surgery    Risks and Recommendations:  The patient has the following additional risks and recommendations for perioperative complications:   - No identified additional risk factors other than previously addressed    Medication Instructions:  will take omeprazole if having symptoms, otherwise no meds morning of surgery    RECOMMENDATION:  APPROVAL GIVEN to proceed with proposed procedure, without further diagnostic evaluation.      30 minutes Time spent doing chart review, history and exam, documentation and further activities per the note        Subjective     HPI related to upcoming procedure: patient with desire for permanent contraception, scheduled      Preop Questions 4/7/2022   1. Have you ever had a heart attack or stroke? No   2. Have you ever had surgery on your heart or blood vessels, such as a stent placement, a coronary artery bypass, or surgery on an artery in your head, neck, heart, or legs? No   3. Do you have chest pain with activity? No   4.  Do you have a history of  heart failure? No   5. Do you currently have a cold, bronchitis or symptoms of other infection? No   6. Do you have a cough, shortness of breath, or wheezing? No   7. Do you or anyone in your family have previous history of blood clots? No   8. Do you or does anyone in your family have a serious bleeding problem such as prolonged bleeding following surgeries or cuts? No   9. Have you ever had problems with anemia or been told to take iron pills? No   10. Have you had any abnormal blood loss such as black, tarry or bloody stools, or abnormal vaginal bleeding? No   11. Have you ever had a blood transfusion? No   12. Are you willing to have a blood transfusion if it is medically needed before, during, or after your surgery? Yes   13. Have you or any of your relatives ever had problems with anesthesia? YES - patient notes anesthesia has not been effective for her and has needed high doses   14. Do you have sleep apnea, excessive snoring or daytime drowsiness? No   15. Do you have any artifical heart valves or other implanted medical devices like a pacemaker, defibrillator, or continuous glucose monitor? No   16. Do you have artificial joints? No   17. Are you allergic to latex? No   18. Is there any chance that you may be pregnant? No       Health Care Directive:  Patient does not have a Health Care Directive or Living Will: Discussed advance care planning with patient; however, patient declined at this time.    Preoperative Review of :   reviewed - controlled substances reflected in medication list.      Status of Chronic Conditions:  ADHD is stable, no concerns, medications well tolerated.  Asthma without issues at this time, tends to be seasonal    Review of Systems   Constitutional: Negative.    HENT: Negative.    Eyes: Negative.    Respiratory: Negative.    Cardiovascular: Negative.    Gastrointestinal: Negative.    Endocrine: Negative.    Genitourinary: Negative.    Musculoskeletal:  Negative.    Neurological: Negative.    Hematological: Negative.    Psychiatric/Behavioral: Negative.          Patient Active Problem List    Diagnosis Date Noted     Attention deficit hyperactivity disorder (ADHD), predominantly inattentive type 03/16/2022     Priority: Medium     Chronic headache disorder 03/16/2022     Priority: Medium     Dysmenorrhea 03/16/2022     Priority: Medium     History of varicella 03/16/2022     Priority: Medium     Moderate depressive episode (H) 03/16/2022     Priority: Medium     Postural orthostatic tachycardia syndrome 03/16/2022     Priority: Medium     Seasonal allergic rhinitis 03/16/2022     Priority: Medium     Sleep disorder 03/16/2022     Priority: Medium     MTHFR mutation 06/15/2018     Priority: Medium     Formatting of this note might be different from the original.  Impairment in Folic acid conversion to L Methyfolate        Past Medical History:   Diagnosis Date     Dysmenorrhea 3/16/2022     Postural orthostatic tachycardia syndrome 3/16/2022     Sleep disorder 3/16/2022     No past surgical history on file.  Current Outpatient Medications   Medication Sig Dispense Refill     albuterol (PROAIR HFA) 108 (90 Base) MCG/ACT inhaler Inhale 2 puffs into the lungs every 4 hours as needed       [START ON 5/12/2022] amphetamine-dextroamphetamine (ADDERALL XR) 25 MG 24 hr capsule Take 1 capsule (25 mg) by mouth daily 30 capsule 0     [START ON 4/14/2022] amphetamine-dextroamphetamine (ADDERALL XR) 25 MG 24 hr capsule Take 1 capsule (25 mg) by mouth daily 30 capsule 0     levonorgestrel-ethinyl estradiol (INTROVALE) 0.15-0.03 MG tablet Take 1 tablet by mouth daily 84 tablet 4     omeprazole 20 MG tablet Take 20 mg by mouth daily         Allergies   Allergen Reactions     Sulfa Drugs Hives and Unknown     Other reaction(s): Edema     Bees         Social History     Tobacco Use     Smoking status: Never Smoker     Smokeless tobacco: Never Used   Substance Use Topics     Alcohol  "use: Not on file     History reviewed. No pertinent family history.  History   Drug Use Not on file         Objective     /72 (BP Location: Right arm)   Pulse 80   Temp 98.9  F (37.2  C) (Tympanic)   Ht 1.689 m (5' 6.5\")   Wt 69.8 kg (153 lb 12.8 oz)   SpO2 97%   BMI 24.45 kg/m      Physical Exam    GENERAL APPEARANCE: healthy, alert and no distress     EYES: EOMI, PERRL     HENT: ear canals and TM's normal and nose and mouth without ulcers or lesions     NECK: no adenopathy, no asymmetry, masses, or scars and thyroid normal to palpation     RESP: lungs clear to auscultation - no rales, rhonchi or wheezes     CV: regular rates and rhythm, normal S1 S2, no S3 or S4 and no murmur, click or rub     ABDOMEN:  soft, nontender, no HSM or masses and bowel sounds normal     MS: extremities normal- no gross deformities noted, no evidence of inflammation in joints, FROM in all extremities.     SKIN: no suspicious lesions or rashes     NEURO: Normal strength and tone, sensory exam grossly normal, mentation intact and speech normal     PSYCH: mentation appears normal. and affect normal/bright     LYMPHATICS: No cervical adenopathy      Diagnostics:  Recent Results (from the past 24 hour(s))   CBC with platelets    Collection Time: 04/07/22  2:04 PM   Result Value Ref Range    WBC Count 10.1 4.0 - 11.0 10e3/uL    RBC Count 4.44 3.80 - 5.20 10e6/uL    Hemoglobin 13.2 11.7 - 15.7 g/dL    Hematocrit 41.1 35.0 - 47.0 %    MCV 93 78 - 100 fL    MCH 29.7 26.5 - 33.0 pg    MCHC 32.1 31.5 - 36.5 g/dL    RDW 12.1 10.0 - 15.0 %    Platelet Count 298 150 - 450 10e3/uL          Revised Cardiac Risk Index (RCRI):  The patient has the following serious cardiovascular risks for perioperative complications:   - No serious cardiac risks = 0 points     RCRI Interpretation: 0 points: Class I (very low risk - 0.4% complication rate)           Signed Electronically by: Vielka Foster MD  Copy of this evaluation report is provided to " requesting physician.

## 2022-09-17 ENCOUNTER — HEALTH MAINTENANCE LETTER (OUTPATIENT)
Age: 22
End: 2022-09-17

## 2023-02-11 ENCOUNTER — E-VISIT (OUTPATIENT)
Dept: URGENT CARE | Facility: CLINIC | Age: 23
End: 2023-02-11
Payer: COMMERCIAL

## 2023-02-11 DIAGNOSIS — N39.0 ACUTE UTI (URINARY TRACT INFECTION): Primary | ICD-10-CM

## 2023-02-11 PROCEDURE — 99421 OL DIG E/M SVC 5-10 MIN: CPT | Performed by: PHYSICIAN ASSISTANT

## 2023-02-11 RX ORDER — NITROFURANTOIN 25; 75 MG/1; MG/1
100 CAPSULE ORAL 2 TIMES DAILY
Qty: 10 CAPSULE | Refills: 0 | Status: SHIPPED | OUTPATIENT
Start: 2023-02-11 | End: 2023-02-16

## 2023-02-11 NOTE — PATIENT INSTRUCTIONS
Dear Rhea Mcgraw    After reviewing your responses, I've been able to diagnose you with a urinary tract infection, which is a common infection of the bladder with bacteria.  This is not a sexually transmitted infection, though urinating immediately after intercourse can help prevent infections.  Drinking lots of fluids is also helpful to clear your current infection and prevent the next one.      I have sent a prescription for antibiotics to your pharmacy to treat this infection.    It is important that you take all of your prescribed medication even if your symptoms are improving after a few doses.  Taking all of your medicine helps prevent the symptoms from returning.     If your symptoms worsen, you develop pain in your back or stomach, develop fevers, or are not improving in 5 days, please contact your primary care provider for an appointment or visit any of our convenient Walk-in or Urgent Care Centers to be seen, which can be found on our website here.    Thanks again for choosing us as your health care partner,    Srikanth Winston, [facility], PA-C    Urinary Tract Infections in Women  Urinary tract infections (UTIs) are most often caused by bacteria. These bacteria enter the urinary tract. The bacteria may come from inside the body. Or they may travel from the skin outside the rectum or vagina into the urethra. Female anatomy makes it easy for bacteria from the bowel to enter a woman s urinary tract, which is the most common source of UTI. This means women develop UTIs more often than men. Pain in or around the urinary tract is a common UTI symptom. But the only way to know for sure if you have a UTI for the healthcare provider to test your urine. The two tests that may be done are the urinalysis and urine culture.     Types of UTIs    Cystitis. A bladder infection (cystitis) is the most common UTI in women. You may have urgent or frequent need to pee. You may also have pain, burning when you pee, and  bloody urine.    Urethritis. This is an inflamed urethra, which is the tube that carries urine from the bladder to outside the body. You may have lower stomach or back pain. You may also have urgent or frequent need to pee.    Pyelonephritis. This is a kidney infection. If not treated, it can be serious and damage your kidneys. In severe cases, you may need to stay in the hospital. You may have a fever and lower back pain.    Medicines to treat a UTI  Most UTIs are treated with antibiotics. These kill the bacteria. The length of time you need to take them depends on the type of infection. It may be as short as 3 days. If you have repeated UTIs, you may need a low-dose antibiotic for several months. Take antibiotics exactly as directed. Don t stop taking them until all of the medicine is gone. If you stop taking the antibiotic too soon, the infection may not go away. You may also develop a resistance to the antibiotic. This can make it much harder to treat.   Lifestyle changes to treat and prevent UTIs   The lifestyle changes below will help get rid of your UTI. They may also help prevent future UTIs.     Drink plenty of fluids. This includes water, juice, or other caffeine-free drinks. Fluids help flush bacteria out of your body.    Empty your bladder. Always empty your bladder when you feel the urge to pee. And always pee before going to sleep. Urine that stays in your bladder can lead to infection. Try to pee before and after sex as well.    Practice good personal hygiene. Wipe yourself from front to back after using the toilet. This helps keep bacteria from getting into the urethra.    Use condoms during sex. These help prevent UTIs caused by sexually transmitted bacteria. Also don't use spermicides during sex. These can increase the risk for UTIs. Choose other forms of birth control instead. For women who tend to get UTIs after sex, a low-dose of a preventive antibiotic may be used. Be sure to discuss this  option with your healthcare provider.    Follow up with your healthcare provider as directed. He or she may test to make sure the infection has cleared. If needed, more treatment may be started.  [employer] last reviewed this educational content on 7/1/2019 2000-2021 [employer]. All rights reserved. This information is not intended as a substitute for professional medical care. Always follow your healthcare professional's instructions.

## 2023-02-24 ENCOUNTER — OFFICE VISIT (OUTPATIENT)
Dept: FAMILY MEDICINE | Facility: CLINIC | Age: 23
End: 2023-02-24
Payer: COMMERCIAL

## 2023-02-24 VITALS
SYSTOLIC BLOOD PRESSURE: 109 MMHG | HEIGHT: 67 IN | RESPIRATION RATE: 16 BRPM | TEMPERATURE: 97.6 F | BODY MASS INDEX: 21.04 KG/M2 | WEIGHT: 134.06 LBS | HEART RATE: 105 BPM | DIASTOLIC BLOOD PRESSURE: 72 MMHG | OXYGEN SATURATION: 99 %

## 2023-02-24 DIAGNOSIS — N30.01 ACUTE CYSTITIS WITH HEMATURIA: Primary | ICD-10-CM

## 2023-02-24 LAB
ALBUMIN UR-MCNC: NEGATIVE MG/DL
APPEARANCE UR: CLEAR
BACTERIA #/AREA URNS HPF: ABNORMAL /HPF
BILIRUB UR QL STRIP: NEGATIVE
COLOR UR AUTO: YELLOW
GLUCOSE UR STRIP-MCNC: NEGATIVE MG/DL
HGB UR QL STRIP: ABNORMAL
KETONES UR STRIP-MCNC: NEGATIVE MG/DL
LEUKOCYTE ESTERASE UR QL STRIP: ABNORMAL
NITRATE UR QL: POSITIVE
PH UR STRIP: 5.5 [PH] (ref 5–8)
RBC #/AREA URNS AUTO: ABNORMAL /HPF
SP GR UR STRIP: 1.01 (ref 1–1.03)
SQUAMOUS #/AREA URNS AUTO: ABNORMAL /LPF
UROBILINOGEN UR STRIP-ACNC: 0.2 E.U./DL
WBC #/AREA URNS AUTO: ABNORMAL /HPF

## 2023-02-24 PROCEDURE — 87086 URINE CULTURE/COLONY COUNT: CPT | Performed by: FAMILY MEDICINE

## 2023-02-24 PROCEDURE — 81001 URINALYSIS AUTO W/SCOPE: CPT | Performed by: FAMILY MEDICINE

## 2023-02-24 PROCEDURE — 87186 SC STD MICRODIL/AGAR DIL: CPT | Performed by: FAMILY MEDICINE

## 2023-02-24 PROCEDURE — 99213 OFFICE O/P EST LOW 20 MIN: CPT | Performed by: FAMILY MEDICINE

## 2023-02-24 RX ORDER — CIPROFLOXACIN 500 MG/1
500 TABLET, FILM COATED ORAL 2 TIMES DAILY
Qty: 10 TABLET | Refills: 0 | Status: SHIPPED | OUTPATIENT
Start: 2023-02-24 | End: 2023-03-01

## 2023-02-24 ASSESSMENT — ENCOUNTER SYMPTOMS: CONSTITUTIONAL NEGATIVE: 1

## 2023-02-24 NOTE — PROGRESS NOTES
"  Problem List Items Addressed This Visit    None  Visit Diagnoses     Acute cystitis with hematuria    -  Primary: Sent empiric treatment for urinary tract infection with Macrobid.  No urinalysis or culture available for review.  Positive nitrite and leukocyte esterase on urinalysis today.  Will retreat.  Given that she has some symptoms suggestive of pyelonephritis (flank pain) we will opt for a higher dose of ciprofloxacin.  She is been afebrile and looks well on exam today.  Discussed signs/symptoms that should prompt reevaluation.  Urine culture pending.  In the future, I believe that she should have urinalysis even if she is being treated in asynchronous way (E consult etc.).    Relevant Medications    ciprofloxacin (CIPRO) 500 MG tablet    Other Relevant Orders    UA macro with reflex to Microscopic and Culture - Clinc Collect (Completed)    Urine Microscopic Exam    Urine Culture          Subjective   Rhea is a 22 year old who presents for the following health issues   Chief Complaint   Patient presents with     Follow Up     C/o urine odor, smell and back pain.      Dysuria:    - recently treated for UTI.  symptoms went away and then come back.  Completed macrobid ~7 days ago.  New symptoms for the past 3 days.    - now with smell, burning and urgency.  History of severe UTIs often.        Review of Systems   Constitutional: Negative.    All other systems reviewed and are negative.           Objective    /72 (BP Location: Left arm, Patient Position: Sitting, Cuff Size: Adult Regular)   Pulse 105   Temp 97.6  F (36.4  C) (Oral)   Resp 16   Ht 1.689 m (5' 6.5\")   Wt 60.8 kg (134 lb 1 oz)   LMP  (LMP Unknown)   SpO2 99%   BMI 21.31 kg/m    Body mass index is 21.31 kg/m .  Physical Exam  Nursing note reviewed.   Constitutional:       General: Rhea Mcgraw is not in acute distress.     Appearance: Normal appearance. Rhea Mcgraw is not ill-appearing.   HENT:      Head: " Normocephalic and atraumatic.      Right Ear: External ear normal.      Left Ear: External ear normal.   Eyes:      General: No scleral icterus.     Extraocular Movements: Extraocular movements intact.      Conjunctiva/sclera: Conjunctivae normal.   Cardiovascular:      Rate and Rhythm: Normal rate and regular rhythm.      Heart sounds: Normal heart sounds. No murmur heard.    No friction rub. No gallop.   Pulmonary:      Effort: Pulmonary effort is normal. No respiratory distress.      Breath sounds: Normal breath sounds. No wheezing or rales.   Abdominal:      General: Abdomen is flat.      Palpations: Abdomen is soft. There is no mass.      Tenderness: There is no abdominal tenderness. There is no right CVA tenderness or left CVA tenderness.   Musculoskeletal:         General: No swelling. Normal range of motion.   Skin:     General: Skin is warm.      Coloration: Skin is not jaundiced.      Findings: No rash.   Neurological:      General: No focal deficit present.      Mental Status: Rhea Mcgraw is alert and oriented to person, place, and time. Mental status is at baseline.   Psychiatric:         Attention and Perception: Attention normal.         Mood and Affect: Mood normal.         Speech: Speech normal.         Thought Content: Thought content normal.        Evisit note from 2 weeks ago reviewed.  Treated with macrobid.  No culture completed.             This note has been dictated using voice recognition software. Any grammatical or context distortions are unintentional and inherent to the software

## 2023-02-25 LAB — BACTERIA UR CULT: ABNORMAL

## 2023-03-10 ENCOUNTER — MYC REFILL (OUTPATIENT)
Dept: FAMILY MEDICINE | Facility: CLINIC | Age: 23
End: 2023-03-10
Payer: COMMERCIAL

## 2023-03-10 DIAGNOSIS — F90.0 ATTENTION DEFICIT HYPERACTIVITY DISORDER (ADHD), PREDOMINANTLY INATTENTIVE TYPE: ICD-10-CM

## 2023-03-14 RX ORDER — DEXTROAMPHETAMINE SACCHARATE, AMPHETAMINE ASPARTATE MONOHYDRATE, DEXTROAMPHETAMINE SULFATE AND AMPHETAMINE SULFATE 6.25; 6.25; 6.25; 6.25 MG/1; MG/1; MG/1; MG/1
25 CAPSULE, EXTENDED RELEASE ORAL DAILY
Qty: 30 CAPSULE | Refills: 0 | Status: SHIPPED | OUTPATIENT
Start: 2023-03-14 | End: 2023-03-15

## 2023-03-14 NOTE — TELEPHONE ENCOUNTER
Last office visit: 4/7/2022     Future Appointments 3/14/2023 - 9/10/2023      Date Visit Type Length Department Provider     3/22/2023  3:00 PM [facility] OFFICE VISIT  30 min RVFL PRICE/LORIN/Vielka Gotti MD    Location Instructions:     [facility] is located at [address] [place] [address], one block north of [facility]. The clinic shares a building with the [facility] [employer] [facility]; use the clinic s parking lot and entrance on the building s south side.                    Requested Prescriptions   Pending Prescriptions Disp Refills     amphetamine-dextroamphetamine (ADDERALL XR) 25 MG 24 hr capsule 30 capsule 0     Sig: Take 1 capsule (25 mg) by mouth daily       There is no refill protocol information for this order

## 2023-03-15 ENCOUNTER — TELEPHONE (OUTPATIENT)
Dept: FAMILY MEDICINE | Facility: CLINIC | Age: 23
End: 2023-03-15
Payer: COMMERCIAL

## 2023-03-15 DIAGNOSIS — F90.0 ATTENTION DEFICIT HYPERACTIVITY DISORDER (ADHD), PREDOMINANTLY INATTENTIVE TYPE: ICD-10-CM

## 2023-03-15 RX ORDER — DEXTROAMPHETAMINE SACCHARATE, AMPHETAMINE ASPARTATE MONOHYDRATE, DEXTROAMPHETAMINE SULFATE AND AMPHETAMINE SULFATE 6.25; 6.25; 6.25; 6.25 MG/1; MG/1; MG/1; MG/1
25 CAPSULE, EXTENDED RELEASE ORAL DAILY
Qty: 30 CAPSULE | Refills: 0 | Status: SHIPPED | OUTPATIENT
Start: 2023-03-15 | End: 2023-03-22

## 2023-03-15 NOTE — TELEPHONE ENCOUNTER
Patient called in and was wondering if you could resend Adderall 25 MG to [facility] in [place]-     They were out at [facility] when previous RX was sent.

## 2023-03-22 ENCOUNTER — VIRTUAL VISIT (OUTPATIENT)
Dept: FAMILY MEDICINE | Facility: CLINIC | Age: 23
End: 2023-03-22
Payer: COMMERCIAL

## 2023-03-22 DIAGNOSIS — F90.0 ATTENTION DEFICIT HYPERACTIVITY DISORDER (ADHD), PREDOMINANTLY INATTENTIVE TYPE: Primary | ICD-10-CM

## 2023-03-22 DIAGNOSIS — J30.89 SEASONAL ALLERGIC RHINITIS DUE TO OTHER ALLERGIC TRIGGER: ICD-10-CM

## 2023-03-22 PROCEDURE — 99213 OFFICE O/P EST LOW 20 MIN: CPT | Mod: VID | Performed by: FAMILY MEDICINE

## 2023-03-22 RX ORDER — DEXTROAMPHETAMINE SACCHARATE, AMPHETAMINE ASPARTATE, DEXTROAMPHETAMINE SULFATE AND AMPHETAMINE SULFATE 2.5; 2.5; 2.5; 2.5 MG/1; MG/1; MG/1; MG/1
10 TABLET ORAL 2 TIMES DAILY
Qty: 30 TABLET | Refills: 0 | Status: SHIPPED | OUTPATIENT
Start: 2023-04-20 | End: 2023-03-22

## 2023-03-22 RX ORDER — DEXTROAMPHETAMINE SACCHARATE, AMPHETAMINE ASPARTATE, DEXTROAMPHETAMINE SULFATE AND AMPHETAMINE SULFATE 2.5; 2.5; 2.5; 2.5 MG/1; MG/1; MG/1; MG/1
10 TABLET ORAL DAILY
Qty: 30 TABLET | Refills: 0 | Status: SHIPPED | OUTPATIENT
Start: 2023-04-20 | End: 2023-05-31

## 2023-03-22 RX ORDER — DEXTROAMPHETAMINE SACCHARATE, AMPHETAMINE ASPARTATE, DEXTROAMPHETAMINE SULFATE AND AMPHETAMINE SULFATE 2.5; 2.5; 2.5; 2.5 MG/1; MG/1; MG/1; MG/1
10 TABLET ORAL 2 TIMES DAILY
Qty: 30 TABLET | Refills: 0 | Status: SHIPPED | OUTPATIENT
Start: 2023-03-22 | End: 2023-03-22

## 2023-03-22 RX ORDER — ALBUTEROL SULFATE 90 UG/1
2 AEROSOL, METERED RESPIRATORY (INHALATION) EVERY 4 HOURS PRN
Qty: 18 G | Refills: 3 | Status: SHIPPED | OUTPATIENT
Start: 2023-03-22 | End: 2024-07-08

## 2023-03-22 RX ORDER — DEXTROAMPHETAMINE SACCHARATE, AMPHETAMINE ASPARTATE MONOHYDRATE, DEXTROAMPHETAMINE SULFATE AND AMPHETAMINE SULFATE 6.25; 6.25; 6.25; 6.25 MG/1; MG/1; MG/1; MG/1
25 CAPSULE, EXTENDED RELEASE ORAL DAILY
Qty: 30 CAPSULE | Refills: 0 | Status: SHIPPED | OUTPATIENT
Start: 2023-04-12 | End: 2023-11-27 | Stop reason: DRUGHIGH

## 2023-03-22 RX ORDER — DEXTROAMPHETAMINE SACCHARATE, AMPHETAMINE ASPARTATE, DEXTROAMPHETAMINE SULFATE AND AMPHETAMINE SULFATE 2.5; 2.5; 2.5; 2.5 MG/1; MG/1; MG/1; MG/1
10 TABLET ORAL DAILY
Qty: 30 TABLET | Refills: 0 | Status: SHIPPED | OUTPATIENT
Start: 2023-03-22 | End: 2023-09-27

## 2023-03-22 RX ORDER — DEXTROAMPHETAMINE SACCHARATE, AMPHETAMINE ASPARTATE MONOHYDRATE, DEXTROAMPHETAMINE SULFATE AND AMPHETAMINE SULFATE 6.25; 6.25; 6.25; 6.25 MG/1; MG/1; MG/1; MG/1
25 CAPSULE, EXTENDED RELEASE ORAL DAILY
Qty: 30 CAPSULE | Refills: 0 | Status: SHIPPED | OUTPATIENT
Start: 2023-05-10 | End: 2023-05-31

## 2023-03-22 NOTE — PROGRESS NOTES
Rhea is a 23 year old who is being evaluated via a billable video visit.      How would you like to obtain your AVS? MyChart  If the video visit is dropped, the invitation should be resent by: Text to cell phone: [phone]  Will anyone else be joining your video visit? No          Assessment & Plan     Attention deficit hyperactivity disorder (ADHD), predominantly inattentive type  Well-controlled, meds refilled for 2 months, will call when needs further refills.  Would like to resume taking the 10 mg in the afternoon as needed.  Doing well on the lower dose of 25 mg of XR daily.  - amphetamine-dextroamphetamine (ADDERALL XR) 25 MG 24 hr capsule; Take 1 capsule (25 mg) by mouth daily  - amphetamine-dextroamphetamine (ADDERALL XR) 25 MG 24 hr capsule; Take 1 capsule (25 mg) by mouth daily  - amphetamine-dextroamphetamine (ADDERALL) 10 MG tablet; Take 1 tablet (10 mg) by mouth  daily  - amphetamine-dextroamphetamine (ADDERALL) 10 MG tablet; Take 1 tablet (10 mg) by mouth daily    Seasonal allergic rhinitis due to other allergic trigger  Uses albuterol sparingly, refill sent  - albuterol (PROAIR HFA/PROVENTIL HFA/VENTOLIN HFA) 108 (90 Base) MCG/ACT inhaler; Inhale 2 puffs into the lungs every 4 hours as needed for shortness of breath or wheezing                 Vielka Foster MD  [facility]   Rhea is a 23 year old, presenting for the following health issues:  A.D.H.D (Med refill)  No flowsheet data found.  A.D.H.D    Overall patient is doing well.  Feels like the 25 mg is working well.  Does need refills of an afternoon dose to take as needed.  Otherwise no concerns or complaints.  Will do refill of albuterol which she takes seasonally for allergies.            Review of Systems         Objective           Vitals:  No vitals were obtained today due to virtual visit.    Physical Exam   GENERAL: Healthy, alert and no distress  EYES: Eyes grossly normal to inspection.  No  discharge or erythema, or obvious scleral/conjunctival abnormalities.  RESP: No audible wheeze, cough, or visible cyanosis.  No visible retractions or increased work of breathing.    SKIN: Visible skin clear. No significant rash, abnormal pigmentation or lesions.  NEURO: Cranial nerves grossly intact.  Mentation and speech appropriate for age.  PSYCH: Mentation appears normal, affect normal/bright, judgement and insight intact, normal speech and appearance well-groomed.                Video-Visit Details    Type of service:  Video Visit     Originating Location (pt. Location): Home    Distant Location (provider location):  On-site  Platform used for Video Visit: [employer]

## 2023-05-06 ENCOUNTER — HEALTH MAINTENANCE LETTER (OUTPATIENT)
Age: 23
End: 2023-05-06

## 2023-05-31 ENCOUNTER — OFFICE VISIT (OUTPATIENT)
Dept: FAMILY MEDICINE | Facility: CLINIC | Age: 23
End: 2023-05-31
Payer: COMMERCIAL

## 2023-05-31 VITALS
SYSTOLIC BLOOD PRESSURE: 118 MMHG | OXYGEN SATURATION: 97 % | WEIGHT: 130 LBS | DIASTOLIC BLOOD PRESSURE: 76 MMHG | RESPIRATION RATE: 18 BRPM | HEART RATE: 70 BPM | BODY MASS INDEX: 20.4 KG/M2 | TEMPERATURE: 99.3 F | HEIGHT: 67 IN

## 2023-05-31 DIAGNOSIS — M25.50 ARTHRALGIA, UNSPECIFIED JOINT: ICD-10-CM

## 2023-05-31 DIAGNOSIS — F90.0 ATTENTION DEFICIT HYPERACTIVITY DISORDER (ADHD), PREDOMINANTLY INATTENTIVE TYPE: ICD-10-CM

## 2023-05-31 DIAGNOSIS — Z00.00 WELL ADULT EXAM: Primary | ICD-10-CM

## 2023-05-31 PROBLEM — F32.A MODERATE DEPRESSIVE EPISODE: Status: RESOLVED | Noted: 2022-03-16 | Resolved: 2023-05-31

## 2023-05-31 PROCEDURE — 91312 COVID-19 BIVALENT 12+ (PFIZER): CPT | Performed by: FAMILY MEDICINE

## 2023-05-31 PROCEDURE — 90471 IMMUNIZATION ADMIN: CPT | Performed by: FAMILY MEDICINE

## 2023-05-31 PROCEDURE — 99213 OFFICE O/P EST LOW 20 MIN: CPT | Mod: 25 | Performed by: FAMILY MEDICINE

## 2023-05-31 PROCEDURE — 90715 TDAP VACCINE 7 YRS/> IM: CPT | Performed by: FAMILY MEDICINE

## 2023-05-31 PROCEDURE — 99395 PREV VISIT EST AGE 18-39: CPT | Mod: 25 | Performed by: FAMILY MEDICINE

## 2023-05-31 PROCEDURE — 0124A COVID-19 BIVALENT 12+ (PFIZER): CPT | Performed by: FAMILY MEDICINE

## 2023-05-31 RX ORDER — DEXTROAMPHETAMINE SACCHARATE, AMPHETAMINE ASPARTATE, DEXTROAMPHETAMINE SULFATE AND AMPHETAMINE SULFATE 2.5; 2.5; 2.5; 2.5 MG/1; MG/1; MG/1; MG/1
10 TABLET ORAL DAILY
Qty: 30 TABLET | Refills: 0 | Status: SHIPPED | OUTPATIENT
Start: 2023-05-31 | End: 2023-09-27

## 2023-05-31 RX ORDER — DEXTROAMPHETAMINE SACCHARATE, AMPHETAMINE ASPARTATE MONOHYDRATE, DEXTROAMPHETAMINE SULFATE AND AMPHETAMINE SULFATE 6.25; 6.25; 6.25; 6.25 MG/1; MG/1; MG/1; MG/1
25 CAPSULE, EXTENDED RELEASE ORAL DAILY
Qty: 30 CAPSULE | Refills: 0 | Status: SHIPPED | OUTPATIENT
Start: 2023-06-30 | End: 2023-11-27 | Stop reason: DRUGHIGH

## 2023-05-31 RX ORDER — DEXTROAMPHETAMINE SACCHARATE, AMPHETAMINE ASPARTATE MONOHYDRATE, DEXTROAMPHETAMINE SULFATE AND AMPHETAMINE SULFATE 6.25; 6.25; 6.25; 6.25 MG/1; MG/1; MG/1; MG/1
25 CAPSULE, EXTENDED RELEASE ORAL DAILY
Qty: 30 CAPSULE | Refills: 0 | Status: SHIPPED | OUTPATIENT
Start: 2023-05-31 | End: 2023-09-14

## 2023-05-31 RX ORDER — DEXTROAMPHETAMINE SACCHARATE, AMPHETAMINE ASPARTATE, DEXTROAMPHETAMINE SULFATE AND AMPHETAMINE SULFATE 2.5; 2.5; 2.5; 2.5 MG/1; MG/1; MG/1; MG/1
10 TABLET ORAL DAILY
Qty: 30 TABLET | Refills: 0 | Status: SHIPPED | OUTPATIENT
Start: 2023-06-30 | End: 2023-09-27

## 2023-05-31 ASSESSMENT — ENCOUNTER SYMPTOMS
DIARRHEA: 0
WEAKNESS: 0
NERVOUS/ANXIOUS: 0
FREQUENCY: 0
HEARTBURN: 0
NAUSEA: 0
PALPITATIONS: 0
ARTHRALGIAS: 0
SHORTNESS OF BREATH: 0
PARESTHESIAS: 0
FEVER: 0
ABDOMINAL PAIN: 0
SORE THROAT: 0
DYSURIA: 0
JOINT SWELLING: 0
BREAST MASS: 0
HEMATOCHEZIA: 0
HEADACHES: 0
CHILLS: 0
MYALGIAS: 0
HEMATURIA: 0
DIZZINESS: 0
EYE PAIN: 0

## 2023-05-31 NOTE — PROGRESS NOTES
SUBJECTIVE:   CC: Rhea is an 23 year old who presents for preventive health visit.       5/31/2023     2:10 PM   Additional Questions   Roomed by ANSELMO Colbert     Healthy Habits:    Getting at least 3 servings of Calcium per day:  NO    Bi-annual eye exam:  NO    Dental care twice a year:  NO    Sleep apnea or symptoms of sleep apnea:  None    Diet:  Regular (no restrictions)    Frequency of exercise:  2-3 days/week    Duration of exercise:  15-30 minutes    Taking medications regularly:  Yes    Medication side effects:  Not applicable    PHQ-2 Total Score:    Additional concerns today:  Yes    ADHD refill, doing well, no ongoing concerns, current medication is working well, continue every 4-month follow-up    Hypermobile joints, has had several orthopedic injuries, ongoing fatigue, wonders about EDS.    Patient also notes ongoing issues with immune system.  Has noted that she is very sensitive to illnesses.  Very concerned about getting COVID.  Has been isolating at home.  Will do COVID-19 booster today.          Social History     Tobacco Use     Smoking status: Never     Passive exposure: Past     Smokeless tobacco: Never   Vaping Use     Vaping status: Never Used   Substance Use Topics     Alcohol use: Not on file             5/31/2023     1:19 PM   Alcohol Use   Prescreen: >3 drinks/day or >7 drinks/week? No     Reviewed orders with patient.  Reviewed health maintenance and updated orders accordingly - Yes      Breast Cancer Screening:    FHS-7:       5/31/2023     1:20 PM   Breast CA Risk Assessment (FHS-7)   Did any of your first-degree relatives have breast or ovarian cancer? Yes   Did any of your relatives have bilateral breast cancer? Unknown   Did any man in your family have breast cancer? Unknown   Did any woman in your family have breast and ovarian cancer? Yes   Did any woman in your family have breast cancer before age 50 y? Unknown   Do you have 2 or more relatives with breast and/or ovarian  "cancer? Yes   Do you have 2 or more relatives with breast and/or bowel cancer? Unknown       Patient under 40 years of age: Routine Mammogram Screening not recommended.   Pertinent mammograms are reviewed under the imaging tab.    History of abnormal Pap smear: NO - age 21-29 PAP every 3 years recommended     Reviewed and updated as needed this visit by clinical staff   Tobacco  Allergies  Meds  Problems  Med Hx  Surg Hx  Fam Hx          Reviewed and updated as needed this visit by Provider   Tobacco  Allergies  Meds  Problems  Med Hx  Surg Hx  Fam Hx             Review of Systems   Constitutional: Negative for chills and fever.   HENT: Negative for congestion, ear pain, hearing loss and sore throat.    Eyes: Negative for pain and visual disturbance.   Respiratory: Negative for shortness of breath.    Cardiovascular: Negative for chest pain and palpitations.   Gastrointestinal: Negative for abdominal pain, diarrhea and nausea.   Genitourinary: Negative for dysuria, frequency, genital sores, hematuria and urgency.   Musculoskeletal: Negative for arthralgias, joint swelling and myalgias.   Skin: Negative for rash.   Neurological: Negative for dizziness, weakness and headaches.   Psychiatric/Behavioral: The patient is not nervous/anxious.           OBJECTIVE:   /76 (BP Location: Right arm, Patient Position: Sitting, Cuff Size: Adult Regular)   Pulse 70   Temp 99.3  F (37.4  C) (Tympanic)   Resp 18   Ht 1.689 m (5' 6.5\")   Wt 59 kg (130 lb)   LMP 05/25/2023 (Exact Date)   SpO2 97%   BMI 20.67 kg/m    Physical Exam  GENERAL: healthy, alert and no distress  EYES: Eyes grossly normal to inspection, PERRL and conjunctivae and sclerae normal  HENT: ear canals and TM's normal, nose and mouth without ulcers or lesions  NECK: no adenopathy, no asymmetry, masses, or scars and thyroid normal to palpation  RESP: lungs clear to auscultation - no rales, rhonchi or wheezes  CV: regular rate and rhythm, " normal S1 S2, no S3 or S4, no murmur, click or rub, no peripheral edema and peripheral pulses strong  ABDOMEN: soft, nontender, no hepatosplenomegaly, no masses and bowel sounds normal  MS: no gross musculoskeletal defects noted, no edema  SKIN: no suspicious lesions or rashes  NEURO: Normal strength and tone, mentation intact and speech normal  PSYCH: mentation appears normal, affect normal/bright        ASSESSMENT/PLAN:   Well adult exam   Health maintenance updated, preventive services reviewed, vaccines discussed, questions are answered, patient encouraged to continue annual wellness visits to review preventive services.  Given underlying health concerns and concerns about immune system, will give COVID booster today.    Attention deficit hyperactivity disorder (ADHD), predominantly inattentive type  Stable on current regimen, refilled for 2 months, call in 2 months for 2 additional refills and follow-up in 4 months  - amphetamine-dextroamphetamine (ADDERALL) 10 MG tablet; Take 1 tablet (10 mg) by mouth daily  - amphetamine-dextroamphetamine (ADDERALL XR) 25 MG 24 hr capsule; Take 1 capsule (25 mg) by mouth daily  - amphetamine-dextroamphetamine (ADDERALL XR) 25 MG 24 hr capsule; Take 1 capsule (25 mg) by mouth daily  - amphetamine-dextroamphetamine (ADDERALL) 10 MG tablet; Take 1 tablet (10 mg) by mouth daily    Arthralgia, unspecified joint  Patient with chronic fatigue, joint hypermobility, prior orthopedic injuries, concerns about immune response.  Will have her see rheumatology for evaluation for Marcela-Danlos syndrome  - Adult Rheumatology  Referral; Future      Patient has been advised of split billing requirements and indicates understanding: Yes      COUNSELING:  Reviewed preventive health counseling, as reflected in patient instructions        Rhea Mcgraw reports that Rhea Mcgraw has never smoked. Rhea Mcgraw has been exposed to tobacco smoke. Rhea Rincon  Mariluz has never used smokeless tobacco.          Vielka Foster MD  [facility]  Answers for HPI/ROS submitted by the patient on 5/31/2023  Blood in stool: No  heartburn: No  peripheral edema: No  mood changes: No  Skin sensation changes: No  pelvic pain: No  vaginal bleeding: No  vaginal discharge: No  tenderness: No  breast mass: No  breast discharge: No  impotence: No  penile discharge: No

## 2023-06-06 DIAGNOSIS — M25.50 ARTHRALGIA, UNSPECIFIED JOINT: Primary | ICD-10-CM

## 2023-09-14 ENCOUNTER — MYC REFILL (OUTPATIENT)
Dept: FAMILY MEDICINE | Facility: CLINIC | Age: 23
End: 2023-09-14
Payer: COMMERCIAL

## 2023-09-14 DIAGNOSIS — F90.0 ATTENTION DEFICIT HYPERACTIVITY DISORDER (ADHD), PREDOMINANTLY INATTENTIVE TYPE: ICD-10-CM

## 2023-09-15 RX ORDER — DEXTROAMPHETAMINE SACCHARATE, AMPHETAMINE ASPARTATE MONOHYDRATE, DEXTROAMPHETAMINE SULFATE AND AMPHETAMINE SULFATE 6.25; 6.25; 6.25; 6.25 MG/1; MG/1; MG/1; MG/1
25 CAPSULE, EXTENDED RELEASE ORAL DAILY
Qty: 30 CAPSULE | Refills: 0 | Status: SHIPPED | OUTPATIENT
Start: 2023-09-15 | End: 2023-11-27 | Stop reason: DRUGHIGH

## 2023-09-27 ENCOUNTER — VIRTUAL VISIT (OUTPATIENT)
Dept: FAMILY MEDICINE | Facility: CLINIC | Age: 23
End: 2023-09-27

## 2023-09-27 DIAGNOSIS — F90.0 ATTENTION DEFICIT HYPERACTIVITY DISORDER (ADHD), PREDOMINANTLY INATTENTIVE TYPE: ICD-10-CM

## 2023-09-27 PROCEDURE — 99213 OFFICE O/P EST LOW 20 MIN: CPT | Mod: VID | Performed by: FAMILY MEDICINE

## 2023-09-27 RX ORDER — DEXTROAMPHETAMINE SACCHARATE, AMPHETAMINE ASPARTATE MONOHYDRATE, DEXTROAMPHETAMINE SULFATE AND AMPHETAMINE SULFATE 6.25; 6.25; 6.25; 6.25 MG/1; MG/1; MG/1; MG/1
25 CAPSULE, EXTENDED RELEASE ORAL DAILY
Qty: 30 CAPSULE | Refills: 0 | Status: CANCELLED | OUTPATIENT
Start: 2023-09-27

## 2023-09-27 RX ORDER — DEXTROAMPHETAMINE SACCHARATE, AMPHETAMINE ASPARTATE MONOHYDRATE, DEXTROAMPHETAMINE SULFATE AND AMPHETAMINE SULFATE 7.5; 7.5; 7.5; 7.5 MG/1; MG/1; MG/1; MG/1
30 CAPSULE, EXTENDED RELEASE ORAL DAILY
Qty: 30 CAPSULE | Refills: 0 | Status: SHIPPED | OUTPATIENT
Start: 2023-09-27 | End: 2023-11-27

## 2023-09-27 RX ORDER — DEXTROAMPHETAMINE SACCHARATE, AMPHETAMINE ASPARTATE, DEXTROAMPHETAMINE SULFATE AND AMPHETAMINE SULFATE 2.5; 2.5; 2.5; 2.5 MG/1; MG/1; MG/1; MG/1
10 TABLET ORAL DAILY
Qty: 30 TABLET | Refills: 0 | Status: SHIPPED | OUTPATIENT
Start: 2023-10-25 | End: 2023-11-27

## 2023-09-27 RX ORDER — DEXTROAMPHETAMINE SACCHARATE, AMPHETAMINE ASPARTATE, DEXTROAMPHETAMINE SULFATE AND AMPHETAMINE SULFATE 2.5; 2.5; 2.5; 2.5 MG/1; MG/1; MG/1; MG/1
10 TABLET ORAL DAILY
Qty: 30 TABLET | Refills: 0 | Status: SHIPPED | OUTPATIENT
Start: 2023-11-22 | End: 2023-11-27

## 2023-09-27 RX ORDER — DEXTROAMPHETAMINE SACCHARATE, AMPHETAMINE ASPARTATE MONOHYDRATE, DEXTROAMPHETAMINE SULFATE AND AMPHETAMINE SULFATE 7.5; 7.5; 7.5; 7.5 MG/1; MG/1; MG/1; MG/1
30 CAPSULE, EXTENDED RELEASE ORAL DAILY
Qty: 30 CAPSULE | Refills: 0 | Status: SHIPPED | OUTPATIENT
Start: 2023-11-22 | End: 2023-11-27

## 2023-09-27 RX ORDER — DEXTROAMPHETAMINE SACCHARATE, AMPHETAMINE ASPARTATE, DEXTROAMPHETAMINE SULFATE AND AMPHETAMINE SULFATE 2.5; 2.5; 2.5; 2.5 MG/1; MG/1; MG/1; MG/1
10 TABLET ORAL DAILY
Qty: 30 TABLET | Refills: 0 | Status: SHIPPED | OUTPATIENT
Start: 2023-09-27 | End: 2023-11-27

## 2023-09-27 RX ORDER — DEXTROAMPHETAMINE SACCHARATE, AMPHETAMINE ASPARTATE MONOHYDRATE, DEXTROAMPHETAMINE SULFATE AND AMPHETAMINE SULFATE 7.5; 7.5; 7.5; 7.5 MG/1; MG/1; MG/1; MG/1
30 CAPSULE, EXTENDED RELEASE ORAL DAILY
Qty: 30 CAPSULE | Refills: 0 | Status: SHIPPED | OUTPATIENT
Start: 2023-10-25 | End: 2023-11-27

## 2023-09-27 NOTE — PROGRESS NOTES
Rhea is a 23 year old who is being evaluated via a billable video visit.      How would you like to obtain your AVS? MyChart  If the video visit is dropped, the invitation should be resent by: Text to cell phone: [phone]  Will anyone else be joining your video visit? No          Assessment & Plan     Attention deficit hyperactivity disorder (ADHD), predominantly inattentive type  And is well controlled since dropping to the 25 mg XR.  Would like to try going back up to 30 mg.  Refill sent in.  Follow-up in 4 months.  - amphetamine-dextroamphetamine (ADDERALL) 10 MG tablet; Take 1 tablet (10 mg) by mouth daily  - amphetamine-dextroamphetamine (ADDERALL) 10 MG tablet; Take 1 tablet (10 mg) by mouth daily  - amphetamine-dextroamphetamine (ADDERALL) 10 MG tablet; Take 1 tablet (10 mg) by mouth daily  - amphetamine-dextroamphetamine (ADDERALL XR) 30 MG 24 hr capsule; Take 1 capsule (30 mg) by mouth daily  - amphetamine-dextroamphetamine (ADDERALL XR) 30 MG 24 hr capsule; Take 1 capsule (30 mg) by mouth daily  - amphetamine-dextroamphetamine (ADDERALL XR) 30 MG 24 hr capsule; Take 1 capsule (30 mg) by mouth daily                 Vielka Foster MD  [facility]    Subjective   Rhea is a 23 year old, presenting for the following health issues:  Recheck Medication (Patient states no concerns, just needing refills. )      9/27/2023    12:47 PM   Additional Questions   Roomed by Haleigh SANTIAGO CMA   Accompanied by None       History of Present Illness       Reason for visit:  Medication checkup    Rhea Mcgraw eats 0-1 servings of fruits and vegetables daily.Rhea HAYES Rincon McMjuan consumes 0 sweetened beverage(s) daily.Rhea Mcgraw exercises with enough effort to increase Rhea HAYES Sergey Donald's heart rate 10 to 19 minutes per day.  Rhea Mcgraw exercises with enough effort to increase Rhea Colladoritt Charleenjuan's heart rate 3 or less days per week.   Rhea Rincon  Mariluz is taking medications regularly.               Review of Systems         Objective           Vitals:  No vitals were obtained today due to virtual visit.    Physical Exam   GENERAL: Healthy, alert and no distress  EYES: Eyes grossly normal to inspection.  No discharge or erythema, or obvious scleral/conjunctival abnormalities.  RESP: No audible wheeze, cough, or visible cyanosis.  No visible retractions or increased work of breathing.    SKIN: Visible skin clear. No significant rash, abnormal pigmentation or lesions.  NEURO: Cranial nerves grossly intact.  Mentation and speech appropriate for age.  PSYCH: Mentation appears normal, affect normal/bright, judgement and insight intact, normal speech and appearance well-groomed.                Video-Visit Details    Type of service:  Video Visit     Originating Location (pt. Location): Home    Distant Location (provider location):  On-site  Platform used for Video Visit: Lali

## 2023-11-27 ENCOUNTER — OFFICE VISIT (OUTPATIENT)
Dept: FAMILY MEDICINE | Facility: CLINIC | Age: 23
End: 2023-11-27
Payer: COMMERCIAL

## 2023-11-27 VITALS
HEART RATE: 124 BPM | DIASTOLIC BLOOD PRESSURE: 74 MMHG | WEIGHT: 132.8 LBS | RESPIRATION RATE: 14 BRPM | TEMPERATURE: 99 F | HEIGHT: 67 IN | BODY MASS INDEX: 20.84 KG/M2 | SYSTOLIC BLOOD PRESSURE: 114 MMHG | OXYGEN SATURATION: 98 %

## 2023-11-27 DIAGNOSIS — F90.0 ATTENTION DEFICIT HYPERACTIVITY DISORDER (ADHD), PREDOMINANTLY INATTENTIVE TYPE: ICD-10-CM

## 2023-11-27 PROCEDURE — 91320 SARSCV2 VAC 30MCG TRS-SUC IM: CPT | Performed by: FAMILY MEDICINE

## 2023-11-27 PROCEDURE — 99213 OFFICE O/P EST LOW 20 MIN: CPT | Performed by: FAMILY MEDICINE

## 2023-11-27 PROCEDURE — 90480 ADMN SARSCOV2 VAC 1/ONLY CMP: CPT | Performed by: FAMILY MEDICINE

## 2023-11-27 RX ORDER — DEXTROAMPHETAMINE SACCHARATE, AMPHETAMINE ASPARTATE, DEXTROAMPHETAMINE SULFATE AND AMPHETAMINE SULFATE 2.5; 2.5; 2.5; 2.5 MG/1; MG/1; MG/1; MG/1
10 TABLET ORAL DAILY
Qty: 30 TABLET | Refills: 0 | Status: SHIPPED | OUTPATIENT
Start: 2023-11-27 | End: 2024-03-18

## 2023-11-27 RX ORDER — DEXTROAMPHETAMINE SACCHARATE, AMPHETAMINE ASPARTATE, DEXTROAMPHETAMINE SULFATE AND AMPHETAMINE SULFATE 2.5; 2.5; 2.5; 2.5 MG/1; MG/1; MG/1; MG/1
10 TABLET ORAL DAILY
Qty: 30 TABLET | Refills: 0 | Status: CANCELLED | OUTPATIENT
Start: 2023-11-27

## 2023-11-27 RX ORDER — DEXTROAMPHETAMINE SACCHARATE, AMPHETAMINE ASPARTATE, DEXTROAMPHETAMINE SULFATE AND AMPHETAMINE SULFATE 2.5; 2.5; 2.5; 2.5 MG/1; MG/1; MG/1; MG/1
10 TABLET ORAL DAILY
Qty: 30 TABLET | Refills: 0 | Status: SHIPPED | OUTPATIENT
Start: 2024-01-22 | End: 2024-03-18

## 2023-11-27 RX ORDER — DEXTROAMPHETAMINE SACCHARATE, AMPHETAMINE ASPARTATE MONOHYDRATE, DEXTROAMPHETAMINE SULFATE AND AMPHETAMINE SULFATE 7.5; 7.5; 7.5; 7.5 MG/1; MG/1; MG/1; MG/1
30 CAPSULE, EXTENDED RELEASE ORAL DAILY
Qty: 30 CAPSULE | Refills: 0 | Status: CANCELLED | OUTPATIENT
Start: 2023-11-27

## 2023-11-27 RX ORDER — DEXTROAMPHETAMINE SACCHARATE, AMPHETAMINE ASPARTATE, DEXTROAMPHETAMINE SULFATE AND AMPHETAMINE SULFATE 2.5; 2.5; 2.5; 2.5 MG/1; MG/1; MG/1; MG/1
10 TABLET ORAL DAILY
Qty: 30 TABLET | Refills: 0 | Status: SHIPPED | OUTPATIENT
Start: 2023-12-25 | End: 2024-03-18

## 2023-11-27 RX ORDER — DEXTROAMPHETAMINE SACCHARATE, AMPHETAMINE ASPARTATE MONOHYDRATE, DEXTROAMPHETAMINE SULFATE AND AMPHETAMINE SULFATE 7.5; 7.5; 7.5; 7.5 MG/1; MG/1; MG/1; MG/1
30 CAPSULE, EXTENDED RELEASE ORAL DAILY
Qty: 30 CAPSULE | Refills: 0 | Status: SHIPPED | OUTPATIENT
Start: 2023-12-25 | End: 2024-03-18

## 2023-11-27 RX ORDER — DEXTROAMPHETAMINE SACCHARATE, AMPHETAMINE ASPARTATE MONOHYDRATE, DEXTROAMPHETAMINE SULFATE AND AMPHETAMINE SULFATE 7.5; 7.5; 7.5; 7.5 MG/1; MG/1; MG/1; MG/1
30 CAPSULE, EXTENDED RELEASE ORAL DAILY
Qty: 30 CAPSULE | Refills: 0 | Status: SHIPPED | OUTPATIENT
Start: 2024-01-22 | End: 2024-03-05

## 2023-11-27 RX ORDER — DEXTROAMPHETAMINE SACCHARATE, AMPHETAMINE ASPARTATE MONOHYDRATE, DEXTROAMPHETAMINE SULFATE AND AMPHETAMINE SULFATE 7.5; 7.5; 7.5; 7.5 MG/1; MG/1; MG/1; MG/1
30 CAPSULE, EXTENDED RELEASE ORAL DAILY
Qty: 30 CAPSULE | Refills: 0 | Status: SHIPPED | OUTPATIENT
Start: 2023-11-27 | End: 2024-03-18

## 2023-11-27 NOTE — PROGRESS NOTES
"  Assessment & Plan     Attention deficit hyperactivity disorder (ADHD), predominantly inattentive type  Well controlled on current regimen, higher dosing is working better, follow up in 4 months  - amphetamine-dextroamphetamine (ADDERALL XR) 30 MG 24 hr capsule; Take 1 capsule (30 mg) by mouth daily  - amphetamine-dextroamphetamine (ADDERALL XR) 30 MG 24 hr capsule; Take 1 capsule (30 mg) by mouth daily  - amphetamine-dextroamphetamine (ADDERALL XR) 30 MG 24 hr capsule; Take 1 capsule (30 mg) by mouth daily  - amphetamine-dextroamphetamine (ADDERALL) 10 MG tablet; Take 1 tablet (10 mg) by mouth daily  - amphetamine-dextroamphetamine (ADDERALL) 10 MG tablet; Take 1 tablet (10 mg) by mouth daily  - amphetamine-dextroamphetamine (ADDERALL) 10 MG tablet; Take 1 tablet (10 mg) by mouth daily                 Vielka Foster MD  [facility]    Toribio Wilkerson is a 23 year old, presenting for the following health issues:  Recheck Medication (Patient states no concerns, needing refills. )        11/27/2023     1:12 PM   Additional Questions   Roomed by Haleigh SANTIAGO CMA   Accompanied by None       History of Present Illness       Reason for visit:  Med check + covid booster    She eats 2-3 servings of fruits and vegetables daily.She consumes 1 sweetened beverage(s) daily.She exercises with enough effort to increase her heart rate 10 to 19 minutes per day.  She exercises with enough effort to increase her heart rate 3 or less days per week.   She is taking medications regularly.     Doing well on current medication. Tolerating dose increase. Feels like it is working better. No side effects noted.             Review of Systems         Objective    /74   Pulse 98   Temp (!) 124  F (51.1  C)   Resp 14   Ht 1.714 m (5' 7.48\")   Wt 60.2 kg (132 lb 12.8 oz)   LMP 11/13/2023   SpO2 99%   BMI 20.50 kg/m    Body mass index is 20.5 kg/m .  Physical Exam   GENERAL: healthy, alert and no " distress  PSYCH: mentation appears normal, affect normal/bright

## 2024-03-05 ENCOUNTER — MYC REFILL (OUTPATIENT)
Dept: FAMILY MEDICINE | Facility: CLINIC | Age: 24
End: 2024-03-05
Payer: COMMERCIAL

## 2024-03-05 DIAGNOSIS — F90.0 ATTENTION DEFICIT HYPERACTIVITY DISORDER (ADHD), PREDOMINANTLY INATTENTIVE TYPE: ICD-10-CM

## 2024-03-05 RX ORDER — DEXTROAMPHETAMINE SACCHARATE, AMPHETAMINE ASPARTATE MONOHYDRATE, DEXTROAMPHETAMINE SULFATE AND AMPHETAMINE SULFATE 7.5; 7.5; 7.5; 7.5 MG/1; MG/1; MG/1; MG/1
30 CAPSULE, EXTENDED RELEASE ORAL DAILY
Qty: 30 CAPSULE | Refills: 0 | Status: SHIPPED | OUTPATIENT
Start: 2024-03-05 | End: 2024-03-18

## 2024-03-05 NOTE — TELEPHONE ENCOUNTER
Routing refill request to provider for review/approval because:  Drug not on the [facility] refill protocol     Last Written Prescription Date: 1/22/24  Last Fill Quantity: 30,  # refills: 0   Last office visit: 11/27/2023 ; last virtual visit: 9/27/2023 with prescribing provider   Future Office Visit:   Next 5 appointments (look out 90 days)      Mar 18, 2024  8:45 AM  (Arrive by 8:25 AM)  Provider Visit with Vielka Foster MD  [facility] ([facility] ) [address] [place] [address] [id number] [phone]

## 2024-03-18 ENCOUNTER — OFFICE VISIT (OUTPATIENT)
Dept: FAMILY MEDICINE | Facility: CLINIC | Age: 24
End: 2024-03-18
Payer: COMMERCIAL

## 2024-03-18 VITALS
BODY MASS INDEX: 19.52 KG/M2 | RESPIRATION RATE: 14 BRPM | DIASTOLIC BLOOD PRESSURE: 84 MMHG | SYSTOLIC BLOOD PRESSURE: 128 MMHG | OXYGEN SATURATION: 98 % | WEIGHT: 126.4 LBS | HEART RATE: 120 BPM

## 2024-03-18 DIAGNOSIS — F90.0 ATTENTION DEFICIT HYPERACTIVITY DISORDER (ADHD), PREDOMINANTLY INATTENTIVE TYPE: ICD-10-CM

## 2024-03-18 PROCEDURE — 99213 OFFICE O/P EST LOW 20 MIN: CPT | Performed by: FAMILY MEDICINE

## 2024-03-18 RX ORDER — DEXTROAMPHETAMINE SACCHARATE, AMPHETAMINE ASPARTATE, DEXTROAMPHETAMINE SULFATE AND AMPHETAMINE SULFATE 2.5; 2.5; 2.5; 2.5 MG/1; MG/1; MG/1; MG/1
10 TABLET ORAL DAILY
Qty: 30 TABLET | Refills: 0 | Status: SHIPPED | OUTPATIENT
Start: 2024-03-18 | End: 2024-04-16

## 2024-03-18 RX ORDER — DEXTROAMPHETAMINE SACCHARATE, AMPHETAMINE ASPARTATE, DEXTROAMPHETAMINE SULFATE AND AMPHETAMINE SULFATE 2.5; 2.5; 2.5; 2.5 MG/1; MG/1; MG/1; MG/1
10 TABLET ORAL DAILY
Qty: 30 TABLET | Refills: 0 | Status: SHIPPED | OUTPATIENT
Start: 2024-04-15 | End: 2024-04-16

## 2024-03-18 RX ORDER — DEXTROAMPHETAMINE SACCHARATE, AMPHETAMINE ASPARTATE MONOHYDRATE, DEXTROAMPHETAMINE SULFATE AND AMPHETAMINE SULFATE 7.5; 7.5; 7.5; 7.5 MG/1; MG/1; MG/1; MG/1
30 CAPSULE, EXTENDED RELEASE ORAL DAILY
Qty: 30 CAPSULE | Refills: 0 | Status: SHIPPED | OUTPATIENT
Start: 2024-05-13 | End: 2024-04-16

## 2024-03-18 RX ORDER — DEXTROAMPHETAMINE SACCHARATE, AMPHETAMINE ASPARTATE MONOHYDRATE, DEXTROAMPHETAMINE SULFATE AND AMPHETAMINE SULFATE 7.5; 7.5; 7.5; 7.5 MG/1; MG/1; MG/1; MG/1
30 CAPSULE, EXTENDED RELEASE ORAL DAILY
Qty: 30 CAPSULE | Refills: 0 | Status: SHIPPED | OUTPATIENT
Start: 2024-04-15 | End: 2024-04-16

## 2024-03-18 RX ORDER — DEXTROAMPHETAMINE SACCHARATE, AMPHETAMINE ASPARTATE, DEXTROAMPHETAMINE SULFATE AND AMPHETAMINE SULFATE 2.5; 2.5; 2.5; 2.5 MG/1; MG/1; MG/1; MG/1
10 TABLET ORAL DAILY
Qty: 30 TABLET | Refills: 0 | Status: SHIPPED | OUTPATIENT
Start: 2024-05-13 | End: 2024-04-16

## 2024-03-18 RX ORDER — DEXTROAMPHETAMINE SACCHARATE, AMPHETAMINE ASPARTATE MONOHYDRATE, DEXTROAMPHETAMINE SULFATE AND AMPHETAMINE SULFATE 7.5; 7.5; 7.5; 7.5 MG/1; MG/1; MG/1; MG/1
30 CAPSULE, EXTENDED RELEASE ORAL DAILY
Qty: 30 CAPSULE | Refills: 0 | Status: SHIPPED | OUTPATIENT
Start: 2024-03-18 | End: 2024-04-16

## 2024-03-18 NOTE — PROGRESS NOTES
Assessment & Plan     Attention deficit hyperactivity disorder (ADHD), predominantly inattentive type  Patient with ongoing issues with access to the medication due to shortages.  Will follow-up in about 3 months.  Medication sent to [facility].  If she needs it sent somewhere else she will reach out let me know.  - amphetamine-dextroamphetamine (ADDERALL XR) 30 MG 24 hr capsule; Take 1 capsule (30 mg) by mouth daily  - amphetamine-dextroamphetamine (ADDERALL XR) 30 MG 24 hr capsule; Take 1 capsule (30 mg) by mouth daily  - amphetamine-dextroamphetamine (ADDERALL XR) 30 MG 24 hr capsule; Take 1 capsule (30 mg) by mouth daily  - amphetamine-dextroamphetamine (ADDERALL) 10 MG tablet; Take 1 tablet (10 mg) by mouth daily  - amphetamine-dextroamphetamine (ADDERALL) 10 MG tablet; Take 1 tablet (10 mg) by mouth daily  - amphetamine-dextroamphetamine (ADDERALL) 10 MG tablet; Take 1 tablet (10 mg) by mouth daily                  Toribio Wilkerson is a 24 year old, presenting for the following health issues:  Recheck Medication        3/18/2024     8:43 AM   Additional Questions   Roomed by Haleigh SANTIAGO CMA   Accompanied by None     History of Present Illness       Reason for visit:  Med check    She eats 2-3 servings of fruits and vegetables daily.She consumes 1 sweetened beverage(s) daily.She exercises with enough effort to increase her heart rate 10 to 19 minutes per day.  She exercises with enough effort to increase her heart rate 4 days per week.   She is not taking prescribed medications regularly due to other.     Patient here for ADHD follow-up.  Other than access to medications she feels like symptoms have been under good control.  She does not have any other concerns or questions today.  Does have some anxiety about an upcoming trip.  She will be traveling to [place] with her significant other to be in the path of the total solar eclipse.    Notes that she has some ongoing issues with access to medications.  Shortages  have made it difficult to get access to the Adderall on a regular basis.          Objective    /84   Pulse 120   Resp 14   Wt 57.3 kg (126 lb 6.4 oz)   LMP 03/04/2024 (Within Days)   SpO2 98%   BMI 19.52 kg/m    Body mass index is 19.52 kg/m .  Physical Exam   GENERAL: alert and no distress  PSYCH: mentation appears normal, affect normal/bright            Signed Electronically by: Vielka Foster MD

## 2024-04-16 ENCOUNTER — TELEPHONE (OUTPATIENT)
Dept: FAMILY MEDICINE | Facility: CLINIC | Age: 24
End: 2024-04-16
Payer: COMMERCIAL

## 2024-04-16 DIAGNOSIS — F90.0 ATTENTION DEFICIT HYPERACTIVITY DISORDER (ADHD), PREDOMINANTLY INATTENTIVE TYPE: ICD-10-CM

## 2024-04-16 RX ORDER — DEXTROAMPHETAMINE SACCHARATE, AMPHETAMINE ASPARTATE, DEXTROAMPHETAMINE SULFATE AND AMPHETAMINE SULFATE 2.5; 2.5; 2.5; 2.5 MG/1; MG/1; MG/1; MG/1
10 TABLET ORAL DAILY
Qty: 30 TABLET | Refills: 0 | Status: SHIPPED | OUTPATIENT
Start: 2024-04-16 | End: 2024-07-08

## 2024-04-16 RX ORDER — DEXTROAMPHETAMINE SACCHARATE, AMPHETAMINE ASPARTATE MONOHYDRATE, DEXTROAMPHETAMINE SULFATE AND AMPHETAMINE SULFATE 7.5; 7.5; 7.5; 7.5 MG/1; MG/1; MG/1; MG/1
30 CAPSULE, EXTENDED RELEASE ORAL DAILY
Qty: 30 CAPSULE | Refills: 0 | Status: SHIPPED | OUTPATIENT
Start: 2024-05-13 | End: 2024-07-08

## 2024-04-16 RX ORDER — DEXTROAMPHETAMINE SACCHARATE, AMPHETAMINE ASPARTATE, DEXTROAMPHETAMINE SULFATE AND AMPHETAMINE SULFATE 2.5; 2.5; 2.5; 2.5 MG/1; MG/1; MG/1; MG/1
10 TABLET ORAL DAILY
Qty: 30 TABLET | Refills: 0 | Status: SHIPPED | OUTPATIENT
Start: 2024-05-13 | End: 2024-07-08

## 2024-04-16 RX ORDER — DEXTROAMPHETAMINE SACCHARATE, AMPHETAMINE ASPARTATE, DEXTROAMPHETAMINE SULFATE AND AMPHETAMINE SULFATE 2.5; 2.5; 2.5; 2.5 MG/1; MG/1; MG/1; MG/1
10 TABLET ORAL DAILY
Qty: 30 TABLET | Refills: 0 | Status: SHIPPED | OUTPATIENT
Start: 2024-06-10 | End: 2024-07-08

## 2024-04-16 RX ORDER — DEXTROAMPHETAMINE SACCHARATE, AMPHETAMINE ASPARTATE MONOHYDRATE, DEXTROAMPHETAMINE SULFATE AND AMPHETAMINE SULFATE 7.5; 7.5; 7.5; 7.5 MG/1; MG/1; MG/1; MG/1
30 CAPSULE, EXTENDED RELEASE ORAL DAILY
Qty: 30 CAPSULE | Refills: 0 | Status: SHIPPED | OUTPATIENT
Start: 2024-04-16 | End: 2024-06-20

## 2024-04-16 RX ORDER — DEXTROAMPHETAMINE SACCHARATE, AMPHETAMINE ASPARTATE MONOHYDRATE, DEXTROAMPHETAMINE SULFATE AND AMPHETAMINE SULFATE 7.5; 7.5; 7.5; 7.5 MG/1; MG/1; MG/1; MG/1
30 CAPSULE, EXTENDED RELEASE ORAL DAILY
Qty: 30 CAPSULE | Refills: 0 | Status: SHIPPED | OUTPATIENT
Start: 2024-06-10 | End: 2024-07-08

## 2024-04-16 NOTE — TELEPHONE ENCOUNTER
General Call    Contacts         Type Contact Phone/Fax    04/16/2024 11:02 AM CDT Phone (Incoming) Rhea Wisdom (Self) [phone] (M)          Reason for Call:  switch pharmacy    What are your questions or concerns:  pt would like the 3 months Rx's of amphetamine-dextroamphetamine (ADDERALL) 10 MG tablet & amphetamine-dextroamphetamine (ADDERALL XR) 30 MG 24 hr capsule  sent to [facility] [place] [place]    Date of last appointment with provider: 3/18/2024    Could we send this information to you in [employer] or would you prefer to receive a phone call?:   Patient would prefer a phone call   Okay to leave a detailed message?: Yes at Cell number on file:    Telephone Information:   Mobile [phone]

## 2024-06-20 ENCOUNTER — MYC REFILL (OUTPATIENT)
Dept: FAMILY MEDICINE | Facility: CLINIC | Age: 24
End: 2024-06-20
Payer: COMMERCIAL

## 2024-06-20 DIAGNOSIS — F90.0 ATTENTION DEFICIT HYPERACTIVITY DISORDER (ADHD), PREDOMINANTLY INATTENTIVE TYPE: ICD-10-CM

## 2024-06-20 NOTE — TELEPHONE ENCOUNTER
Last office visit: 3/18/2024     Future Appointments 6/20/2024 - 12/17/2024        Date Visit Type Length Department Provider     7/8/2024  2:15 PM OFFICE VISIT 30 min RVFL PRICE/LORIN/Vielka Gotti MD    Location Instructions:     [facility] is located at [address] [place] [address], one block north of [facility] and the [facility]. The clinic shares a building with the [facility] [employer]; use the clinic s parking lot and entrance on the building s south side.                      Requested Prescriptions   Pending Prescriptions Disp Refills    amphetamine-dextroamphetamine (ADDERALL XR) 30 MG 24 hr capsule 30 capsule 0     Sig: Take 1 capsule (30 mg) by mouth daily       There is no refill protocol information for this order

## 2024-06-21 RX ORDER — DEXTROAMPHETAMINE SACCHARATE, AMPHETAMINE ASPARTATE MONOHYDRATE, DEXTROAMPHETAMINE SULFATE AND AMPHETAMINE SULFATE 7.5; 7.5; 7.5; 7.5 MG/1; MG/1; MG/1; MG/1
30 CAPSULE, EXTENDED RELEASE ORAL DAILY
Qty: 30 CAPSULE | Refills: 0 | Status: SHIPPED | OUTPATIENT
Start: 2024-06-21 | End: 2024-07-08

## 2024-07-08 ENCOUNTER — VIRTUAL VISIT (OUTPATIENT)
Dept: FAMILY MEDICINE | Facility: CLINIC | Age: 24
End: 2024-07-08
Payer: COMMERCIAL

## 2024-07-08 DIAGNOSIS — F90.0 ATTENTION DEFICIT HYPERACTIVITY DISORDER (ADHD), PREDOMINANTLY INATTENTIVE TYPE: ICD-10-CM

## 2024-07-08 DIAGNOSIS — H60.391 INFECTIVE OTITIS EXTERNA, RIGHT: Primary | ICD-10-CM

## 2024-07-08 DIAGNOSIS — J30.89 SEASONAL ALLERGIC RHINITIS DUE TO OTHER ALLERGIC TRIGGER: ICD-10-CM

## 2024-07-08 PROCEDURE — 99214 OFFICE O/P EST MOD 30 MIN: CPT | Mod: 95 | Performed by: FAMILY MEDICINE

## 2024-07-08 RX ORDER — ALBUTEROL SULFATE 90 UG/1
2 AEROSOL, METERED RESPIRATORY (INHALATION) EVERY 4 HOURS PRN
Qty: 18 G | Refills: 3 | Status: SHIPPED | OUTPATIENT
Start: 2024-07-08

## 2024-07-08 RX ORDER — OFLOXACIN 3 MG/ML
5 SOLUTION AURICULAR (OTIC) 2 TIMES DAILY
Qty: 5 ML | Refills: 1 | Status: SHIPPED | OUTPATIENT
Start: 2024-07-08 | End: 2024-09-13

## 2024-07-08 RX ORDER — OFLOXACIN 3 MG/ML
5 SOLUTION AURICULAR (OTIC) DAILY
Qty: 5 ML | Refills: 1 | Status: SHIPPED | OUTPATIENT
Start: 2024-07-08 | End: 2024-07-08

## 2024-07-08 RX ORDER — DEXTROAMPHETAMINE SACCHARATE, AMPHETAMINE ASPARTATE, DEXTROAMPHETAMINE SULFATE AND AMPHETAMINE SULFATE 2.5; 2.5; 2.5; 2.5 MG/1; MG/1; MG/1; MG/1
10 TABLET ORAL DAILY
Qty: 30 TABLET | Refills: 0 | Status: SHIPPED | OUTPATIENT
Start: 2024-08-05

## 2024-07-08 RX ORDER — DEXTROAMPHETAMINE SACCHARATE, AMPHETAMINE ASPARTATE MONOHYDRATE, DEXTROAMPHETAMINE SULFATE AND AMPHETAMINE SULFATE 7.5; 7.5; 7.5; 7.5 MG/1; MG/1; MG/1; MG/1
30 CAPSULE, EXTENDED RELEASE ORAL DAILY
Qty: 30 CAPSULE | Refills: 0 | Status: SHIPPED | OUTPATIENT
Start: 2024-08-05 | End: 2024-08-12

## 2024-07-08 RX ORDER — DEXTROAMPHETAMINE SACCHARATE, AMPHETAMINE ASPARTATE MONOHYDRATE, DEXTROAMPHETAMINE SULFATE AND AMPHETAMINE SULFATE 7.5; 7.5; 7.5; 7.5 MG/1; MG/1; MG/1; MG/1
30 CAPSULE, EXTENDED RELEASE ORAL DAILY
Qty: 30 CAPSULE | Refills: 0 | Status: SHIPPED | OUTPATIENT
Start: 2024-09-02 | End: 2024-08-12

## 2024-07-08 RX ORDER — DEXTROAMPHETAMINE SACCHARATE, AMPHETAMINE ASPARTATE, DEXTROAMPHETAMINE SULFATE AND AMPHETAMINE SULFATE 2.5; 2.5; 2.5; 2.5 MG/1; MG/1; MG/1; MG/1
10 TABLET ORAL DAILY
Qty: 30 TABLET | Refills: 0 | Status: SHIPPED | OUTPATIENT
Start: 2024-09-02

## 2024-07-08 RX ORDER — DEXTROAMPHETAMINE SACCHARATE, AMPHETAMINE ASPARTATE MONOHYDRATE, DEXTROAMPHETAMINE SULFATE AND AMPHETAMINE SULFATE 7.5; 7.5; 7.5; 7.5 MG/1; MG/1; MG/1; MG/1
30 CAPSULE, EXTENDED RELEASE ORAL DAILY
Qty: 30 CAPSULE | Refills: 0 | Status: SHIPPED | OUTPATIENT
Start: 2024-07-08 | End: 2024-08-12

## 2024-07-08 RX ORDER — DEXTROAMPHETAMINE SACCHARATE, AMPHETAMINE ASPARTATE, DEXTROAMPHETAMINE SULFATE AND AMPHETAMINE SULFATE 2.5; 2.5; 2.5; 2.5 MG/1; MG/1; MG/1; MG/1
10 TABLET ORAL DAILY
Qty: 30 TABLET | Refills: 0 | Status: SHIPPED | OUTPATIENT
Start: 2024-07-08 | End: 2024-08-12

## 2024-07-08 NOTE — PROGRESS NOTES
Rhea is a 24 year old who is being evaluated via a billable video visit.    How would you like to obtain your AVS? MyChart  If the video visit is dropped, the invitation should be resent by: Text to cell phone: [phone]  Will anyone else be joining your video visit? No      Assessment & Plan     Attention deficit hyperactivity disorder (ADHD), predominantly inattentive type  Prescriptions refilled at same dose.  We will follow-up again in 3 months at her request.  Could consider doing 6-month follow-up in the future if she remains stable.  - amphetamine-dextroamphetamine (ADDERALL XR) 30 MG 24 hr capsule; Take 1 capsule (30 mg) by mouth daily  - amphetamine-dextroamphetamine (ADDERALL XR) 30 MG 24 hr capsule; Take 1 capsule (30 mg) by mouth daily  - amphetamine-dextroamphetamine (ADDERALL XR) 30 MG 24 hr capsule; Take 1 capsule (30 mg) by mouth daily  - amphetamine-dextroamphetamine (ADDERALL) 10 MG tablet; Take 1 tablet (10 mg) by mouth daily  - amphetamine-dextroamphetamine (ADDERALL) 10 MG tablet; Take 1 tablet (10 mg) by mouth daily  - amphetamine-dextroamphetamine (ADDERALL) 10 MG tablet; Take 1 tablet (10 mg) by mouth daily    Seasonal allergic rhinitis due to other allergic trigger  Increased seasonal allergy symptoms, needs refill of albuterol inhaler, has used very sparingly previously  - albuterol (PROAIR HFA/PROVENTIL HFA/VENTOLIN HFA) 108 (90 Base) MCG/ACT inhaler; Inhale 2 puffs into the lungs every 4 hours as needed for shortness of breath or wheezing    Infective otitis externa, right  Recurrence of otitis externa, sent in antibiotic drops to use twice a day.  Let her know that if symptoms do not resolve or pain worsens she should be seen in person.  - ofloxacin (FLOXIN) 0.3 % otic solution; Place 5 drops into the right ear daily for 7 days                Subjective   Rhea is a 24 year old, presenting for the following health issues:  Recheck Medication (Patient states no questions/concerns,  needing refills. )        7/8/2024     1:53 PM   Additional Questions   Roomed by Haleigh SANTIAGO CMA   Accompanied by None     Video Start Time: 2:05 PM    Due for adhd follow up. Noting that attention is not fully controlled but worries about side effects from increasing dose. Would like to stay at same dose.    Also note allegies are bad, needs refill of albuterol    Patient has also noticed recurrence of right ear pain.  Has a history of intermittent prior otitis externa.  Notes that usually drops are helpful.  Does not remember having to go on antibiotic orally.  No fever.  No congestion.  No sinus pressure.    History of Present Illness       Reason for visit:  Med check    She eats 2-3 servings of fruits and vegetables daily.She consumes 1 sweetened beverage(s) daily.She exercises with enough effort to increase her heart rate 10 to 19 minutes per day.  She exercises with enough effort to increase her heart rate 3 or less days per week. She is missing 1 dose(s) of medications per week.  She is not taking prescribed medications regularly due to other.                   Objective           Vitals:  No vitals were obtained today due to virtual visit.    Physical Exam   GENERAL: alert and no distress  EYES: Eyes grossly normal to inspection.  No discharge or erythema, or obvious scleral/conjunctival abnormalities.  RESP: No audible wheeze, cough, or visible cyanosis.    SKIN: Visible skin clear. No significant rash, abnormal pigmentation or lesions.  NEURO: Cranial nerves grossly intact.  Mentation and speech appropriate for age.  PSYCH: Appropriate affect, tone, and pace of words          Video-Visit Details    Type of service:  Video Visit   Video End Time:2:15 PM  Originating Location (pt. Location): Home    Distant Location (provider location):  On-site  Platform used for Video Visit: Lali  Signed Electronically by: Vielka Foster MD

## 2024-07-13 ENCOUNTER — HEALTH MAINTENANCE LETTER (OUTPATIENT)
Age: 24
End: 2024-07-13

## 2024-08-12 ENCOUNTER — OFFICE VISIT (OUTPATIENT)
Dept: FAMILY MEDICINE | Facility: CLINIC | Age: 24
End: 2024-08-12
Payer: COMMERCIAL

## 2024-08-12 VITALS
HEIGHT: 67 IN | OXYGEN SATURATION: 100 % | WEIGHT: 124.3 LBS | SYSTOLIC BLOOD PRESSURE: 119 MMHG | RESPIRATION RATE: 16 BRPM | HEART RATE: 101 BPM | DIASTOLIC BLOOD PRESSURE: 84 MMHG | TEMPERATURE: 99 F | BODY MASS INDEX: 19.51 KG/M2

## 2024-08-12 DIAGNOSIS — F90.0 ATTENTION DEFICIT HYPERACTIVITY DISORDER (ADHD), PREDOMINANTLY INATTENTIVE TYPE: ICD-10-CM

## 2024-08-12 PROCEDURE — 99213 OFFICE O/P EST LOW 20 MIN: CPT | Performed by: FAMILY MEDICINE

## 2024-08-12 RX ORDER — DEXTROAMPHETAMINE SACCHARATE, AMPHETAMINE ASPARTATE MONOHYDRATE, DEXTROAMPHETAMINE SULFATE AND AMPHETAMINE SULFATE 7.5; 7.5; 7.5; 7.5 MG/1; MG/1; MG/1; MG/1
30 CAPSULE, EXTENDED RELEASE ORAL DAILY
Qty: 30 CAPSULE | Refills: 0 | Status: SHIPPED | OUTPATIENT
Start: 2024-08-19

## 2024-08-12 RX ORDER — DEXTROAMPHETAMINE SACCHARATE, AMPHETAMINE ASPARTATE MONOHYDRATE, DEXTROAMPHETAMINE SULFATE AND AMPHETAMINE SULFATE 7.5; 7.5; 7.5; 7.5 MG/1; MG/1; MG/1; MG/1
30 CAPSULE, EXTENDED RELEASE ORAL DAILY
Qty: 30 CAPSULE | Refills: 0 | Status: SHIPPED | OUTPATIENT
Start: 2024-09-16

## 2024-08-12 RX ORDER — DEXTROAMPHETAMINE SACCHARATE, AMPHETAMINE ASPARTATE MONOHYDRATE, DEXTROAMPHETAMINE SULFATE AND AMPHETAMINE SULFATE 7.5; 7.5; 7.5; 7.5 MG/1; MG/1; MG/1; MG/1
30 CAPSULE, EXTENDED RELEASE ORAL DAILY
Qty: 30 CAPSULE | Refills: 0 | Status: SHIPPED | OUTPATIENT
Start: 2024-10-14

## 2024-08-12 RX ORDER — DEXTROAMPHETAMINE SACCHARATE, AMPHETAMINE ASPARTATE, DEXTROAMPHETAMINE SULFATE AND AMPHETAMINE SULFATE 2.5; 2.5; 2.5; 2.5 MG/1; MG/1; MG/1; MG/1
10 TABLET ORAL DAILY
Qty: 30 TABLET | Refills: 0 | Status: SHIPPED | OUTPATIENT
Start: 2024-08-12

## 2024-08-12 NOTE — PROGRESS NOTES
"  Assessment & Plan     Attention deficit hyperactivity disorder (ADHD), predominantly inattentive type  Follow-up in 6 months sooner with any concerns.  Refilled medication  - amphetamine-dextroamphetamine (ADDERALL XR) 30 MG 24 hr capsule; Take 1 capsule (30 mg) by mouth daily  - amphetamine-dextroamphetamine (ADDERALL XR) 30 MG 24 hr capsule; Take 1 capsule (30 mg) by mouth daily  - amphetamine-dextroamphetamine (ADDERALL XR) 30 MG 24 hr capsule; Take 1 capsule (30 mg) by mouth daily  - amphetamine-dextroamphetamine (ADDERALL) 10 MG tablet; Take 1 tablet (10 mg) by mouth daily                Toribio Wilkerson is a 24 year old, presenting for the following health issues:  Refill Request        8/12/2024     2:57 PM   Additional Questions   Roomed by Aisha-ANSELMO     History of Present Illness       Reason for visit:  Med check    She eats 2-3 servings of fruits and vegetables daily.She consumes 1 sweetened beverage(s) daily.She exercises with enough effort to increase her heart rate 10 to 19 minutes per day.  She exercises with enough effort to increase her heart rate 3 or less days per week.   She is taking medications regularly.     Patient due for ADHD follow-up.  Doing well no concerns.  We reviewed COVID booster which we are anticipating will be available later this spring.  No additional changes at this time.                    Objective    /84 (BP Location: Right arm, Patient Position: Sitting, Cuff Size: Adult Regular)   Pulse 101   Temp 99  F (37.2  C) (Tympanic)   Resp 16   Ht 1.714 m (5' 7.48\")   Wt 56.4 kg (124 lb 4.8 oz)   LMP 07/24/2024 (Within Days)   SpO2 100%   BMI 19.19 kg/m    Body mass index is 19.19 kg/m .  Physical Exam     Alert cooperative in no acute distress  TMs and canals are normal              Signed Electronically by: Vielka Foster MD    "

## 2024-09-13 ENCOUNTER — OFFICE VISIT (OUTPATIENT)
Dept: FAMILY MEDICINE | Facility: CLINIC | Age: 24
End: 2024-09-13
Payer: COMMERCIAL

## 2024-09-13 VITALS
DIASTOLIC BLOOD PRESSURE: 78 MMHG | RESPIRATION RATE: 16 BRPM | OXYGEN SATURATION: 99 % | WEIGHT: 125.3 LBS | HEIGHT: 68 IN | HEART RATE: 116 BPM | TEMPERATURE: 99.1 F | SYSTOLIC BLOOD PRESSURE: 110 MMHG | BODY MASS INDEX: 18.99 KG/M2

## 2024-09-13 DIAGNOSIS — L02.215 PERINEAL ABSCESS: Primary | ICD-10-CM

## 2024-09-13 PROCEDURE — 99213 OFFICE O/P EST LOW 20 MIN: CPT | Performed by: NURSE PRACTITIONER

## 2024-09-13 RX ORDER — DOXYCYCLINE HYCLATE 100 MG
100 TABLET ORAL 2 TIMES DAILY
Qty: 14 TABLET | Refills: 0 | Status: SHIPPED | OUTPATIENT
Start: 2024-09-13 | End: 2024-09-20

## 2024-09-13 NOTE — PROGRESS NOTES
"  Assessment & Plan     Perineal abscess  Firm, erythematous, tender nodule of left buttock/perineum not amenable to I&D.  Has history of abscess requiring I&D and abx.  Will treat with doxycycline for concern for staph infection given history.  She has allergy to bactrim.  Follow-up if symptoms not improving over the next 72 hours, for new or concerning symptoms or not resolved after completion of antibiotic.  Recommend she continue with warm compresses.  - doxycycline hyclate (VIBRA-TABS) 100 MG tablet; Take 1 tablet (100 mg) by mouth 2 times daily for 7 days.                Toribio Wilkerson is a 24 year old, presenting for the following health issues:  ingrown hair (Ingrown infected hair in buttock area x 2 days)        9/13/2024    12:51 PM   Additional Questions   Roomed by JAQUELINE Kirk     Yearly  Started with \"bug bite\" right on sits bones  Woke up the moring and swollen  Painful  Maybe low grade fever, runs lower usually, today 99.1    History of Present Illness       Reason for visit:  Ingrown hair/ cyst  Symptom onset:  1-3 days ago  Symptoms include:  Pain, swelling, large lump under skin, itching, redness  Symptom intensity:  Severe  Symptom progression:  Worsening  Had these symptoms before:  Yes  Has tried/received treatment for these symptoms:  Yes  Previous treatment was successful:  Yes  Prior treatment description:  Lancing the cyst & antibiotics (usually needs two rounds or strong antibiotics)  What makes it worse:  Sitting, walking, laying down.  What makes it better:  Hot compress   She is taking medications regularly.                 Review of Systems  Constitutional, HEENT, cardiovascular, pulmonary, skin, gi and gu systems are negative, except as otherwise noted.      Objective    /78 (BP Location: Right arm, Patient Position: Sitting, Cuff Size: Adult Regular)   Pulse 116   Temp 99.1  F (37.3  C) (Tympanic)   Resp 16   Ht 1.715 m (5' 7.5\")   Wt 56.8 kg (125 lb 4.8 oz)   LMP " 07/24/2024 (Within Days)   SpO2 99%   BMI 19.34 kg/m    Body mass index is 19.34 kg/m .  Physical Exam  Skin:     Findings: Abscess present.             Comments: Erythematous, firm, tender nodule with dark center on left lower buttocks/perineum.  No discharge noted.                    Signed Electronically by: MARION Lawson CNP

## 2024-10-28 ENCOUNTER — E-VISIT (OUTPATIENT)
Dept: FAMILY MEDICINE | Facility: CLINIC | Age: 24
End: 2024-10-28
Payer: COMMERCIAL

## 2024-10-28 DIAGNOSIS — N39.0 ACUTE UTI (URINARY TRACT INFECTION): Primary | ICD-10-CM

## 2024-10-28 PROCEDURE — 99207 PR NON-BILLABLE SERV PER CHARTING: CPT | Performed by: FAMILY MEDICINE

## 2024-10-29 RX ORDER — NITROFURANTOIN 25; 75 MG/1; MG/1
100 CAPSULE ORAL 2 TIMES DAILY
Qty: 10 CAPSULE | Refills: 0 | Status: SHIPPED | OUTPATIENT
Start: 2024-10-29 | End: 2024-11-03

## 2024-10-29 NOTE — PATIENT INSTRUCTIONS
Dear Rhea Mcgraw    After reviewing your responses, I've been able to diagnose you with a urinary tract infection, which is a common infection of the bladder with bacteria.  This is not a sexually transmitted infection, though urinating immediately after intercourse can help prevent infections.  Drinking lots of fluids is also helpful to clear your current infection and prevent the next one.      If you have any concerns and want to confirm that it is a UTI by submitting a urine sample, I have placed an order that would allow you to schedule a lab visit at any of our [facility] sites.    Given that the symptoms are very suspicious for a UTI, I have sent a prescription for antibiotics to your pharmacy to treat this infection and we could treat empirically with antibiotics.    It is important that you take all of your prescribed medication even if your symptoms are improving after a few doses.  Taking all of your medicine helps prevent the symptoms from returning.     If your symptoms worsen, you develop pain in your back or stomach, develop fevers, or are not improving in 5 days, please contact your primary care provider for an appointment or visit any of our convenient Walk-in or Urgent Care Centers to be seen, which can be found on our website here.    Thanks again for choosing us as your health care partner,    Vielka Foster MD

## 2024-11-15 ENCOUNTER — E-VISIT (OUTPATIENT)
Dept: FAMILY MEDICINE | Facility: CLINIC | Age: 24
End: 2024-11-15
Payer: COMMERCIAL

## 2024-11-15 DIAGNOSIS — L73.9 FOLLICULITIS: Primary | ICD-10-CM

## 2024-11-15 RX ORDER — DOXYCYCLINE 100 MG/1
100 CAPSULE ORAL 2 TIMES DAILY
Qty: 20 CAPSULE | Refills: 0 | Status: SHIPPED | OUTPATIENT
Start: 2024-11-15 | End: 2024-11-25

## 2024-11-15 NOTE — PATIENT INSTRUCTIONS
Dear Rhea Mcgraw    I agree given location and appearance that this appears to be folliculitis.  I would recommend that you clean thoroughly on a daily basis and you can certainly apply topical treatment such as benzoyl peroxide's available over-the-counter.  I also sent in doxycycline to be taken twice a day for 10 days.  If it continues to enlarge it may need to be drained and you would need to be seen in person for further evaluation.  That could be managed at an urgent care over the weekend if it is intolerable.  I hope the antibiotics would help with in 2 to 3 days to start to decrease the area of swelling.  If it is not resolving over the next week to 10 days you should be seen.    Thanks for choosing us as your health care partner,    Vielka Foster MD

## 2024-11-25 ENCOUNTER — MYC REFILL (OUTPATIENT)
Dept: FAMILY MEDICINE | Facility: CLINIC | Age: 24
End: 2024-11-25
Payer: COMMERCIAL

## 2024-11-25 DIAGNOSIS — F90.0 ATTENTION DEFICIT HYPERACTIVITY DISORDER (ADHD), PREDOMINANTLY INATTENTIVE TYPE: ICD-10-CM

## 2024-11-25 RX ORDER — DEXTROAMPHETAMINE SACCHARATE, AMPHETAMINE ASPARTATE MONOHYDRATE, DEXTROAMPHETAMINE SULFATE AND AMPHETAMINE SULFATE 7.5; 7.5; 7.5; 7.5 MG/1; MG/1; MG/1; MG/1
30 CAPSULE, EXTENDED RELEASE ORAL DAILY
Qty: 30 CAPSULE | Refills: 0 | Status: SHIPPED | OUTPATIENT
Start: 2024-11-25

## 2024-11-25 RX ORDER — DEXTROAMPHETAMINE SACCHARATE, AMPHETAMINE ASPARTATE MONOHYDRATE, DEXTROAMPHETAMINE SULFATE AND AMPHETAMINE SULFATE 7.5; 7.5; 7.5; 7.5 MG/1; MG/1; MG/1; MG/1
30 CAPSULE, EXTENDED RELEASE ORAL DAILY
Qty: 30 CAPSULE | Refills: 0 | Status: SHIPPED | OUTPATIENT
Start: 2024-12-23

## 2024-11-25 RX ORDER — DEXTROAMPHETAMINE SACCHARATE, AMPHETAMINE ASPARTATE, DEXTROAMPHETAMINE SULFATE AND AMPHETAMINE SULFATE 2.5; 2.5; 2.5; 2.5 MG/1; MG/1; MG/1; MG/1
10 TABLET ORAL DAILY
Qty: 30 TABLET | Refills: 0 | Status: SHIPPED | OUTPATIENT
Start: 2024-12-23

## 2024-11-25 RX ORDER — DEXTROAMPHETAMINE SACCHARATE, AMPHETAMINE ASPARTATE, DEXTROAMPHETAMINE SULFATE AND AMPHETAMINE SULFATE 2.5; 2.5; 2.5; 2.5 MG/1; MG/1; MG/1; MG/1
10 TABLET ORAL DAILY
Qty: 30 TABLET | Refills: 0 | Status: SHIPPED | OUTPATIENT
Start: 2025-01-23

## 2024-11-25 RX ORDER — DEXTROAMPHETAMINE SACCHARATE, AMPHETAMINE ASPARTATE, DEXTROAMPHETAMINE SULFATE AND AMPHETAMINE SULFATE 2.5; 2.5; 2.5; 2.5 MG/1; MG/1; MG/1; MG/1
10 TABLET ORAL DAILY
Qty: 30 TABLET | Refills: 0 | Status: SHIPPED | OUTPATIENT
Start: 2024-11-25

## 2024-11-25 RX ORDER — DEXTROAMPHETAMINE SACCHARATE, AMPHETAMINE ASPARTATE MONOHYDRATE, DEXTROAMPHETAMINE SULFATE AND AMPHETAMINE SULFATE 7.5; 7.5; 7.5; 7.5 MG/1; MG/1; MG/1; MG/1
30 CAPSULE, EXTENDED RELEASE ORAL DAILY
Qty: 30 CAPSULE | Refills: 0 | Status: SHIPPED | OUTPATIENT
Start: 2025-01-23

## 2024-11-25 NOTE — TELEPHONE ENCOUNTER
Last office visit: 9/13/2024     Future Appointments 11/25/2024 - 5/24/2025      None            Requested Prescriptions   Pending Prescriptions Disp Refills    amphetamine-dextroamphetamine (ADDERALL) 10 MG tablet 30 tablet 0     Sig: Take 1 tablet (10 mg) by mouth daily.       There is no refill protocol information for this order

## 2024-11-25 NOTE — TELEPHONE ENCOUNTER
Last office visit: 9/13/2024     Future Appointments 11/25/2024 - 5/24/2025      None            Requested Prescriptions   Pending Prescriptions Disp Refills    amphetamine-dextroamphetamine (ADDERALL XR) 30 MG 24 hr capsule 30 capsule 0     Sig: Take 1 capsule (30 mg) by mouth daily.       There is no refill protocol information for this order

## 2024-12-28 ENCOUNTER — MYC REFILL (OUTPATIENT)
Dept: FAMILY MEDICINE | Facility: CLINIC | Age: 24
End: 2024-12-28
Payer: COMMERCIAL

## 2024-12-28 DIAGNOSIS — F90.0 ATTENTION DEFICIT HYPERACTIVITY DISORDER (ADHD), PREDOMINANTLY INATTENTIVE TYPE: ICD-10-CM

## 2024-12-30 RX ORDER — DEXTROAMPHETAMINE SACCHARATE, AMPHETAMINE ASPARTATE MONOHYDRATE, DEXTROAMPHETAMINE SULFATE AND AMPHETAMINE SULFATE 7.5; 7.5; 7.5; 7.5 MG/1; MG/1; MG/1; MG/1
30 CAPSULE, EXTENDED RELEASE ORAL DAILY
Qty: 30 CAPSULE | Refills: 0 | Status: SHIPPED | OUTPATIENT
Start: 2025-01-27

## 2024-12-30 RX ORDER — DEXTROAMPHETAMINE SACCHARATE, AMPHETAMINE ASPARTATE MONOHYDRATE, DEXTROAMPHETAMINE SULFATE AND AMPHETAMINE SULFATE 7.5; 7.5; 7.5; 7.5 MG/1; MG/1; MG/1; MG/1
30 CAPSULE, EXTENDED RELEASE ORAL DAILY
Qty: 30 CAPSULE | Refills: 0 | Status: SHIPPED | OUTPATIENT
Start: 2025-02-24

## 2024-12-30 RX ORDER — DEXTROAMPHETAMINE SACCHARATE, AMPHETAMINE ASPARTATE MONOHYDRATE, DEXTROAMPHETAMINE SULFATE AND AMPHETAMINE SULFATE 7.5; 7.5; 7.5; 7.5 MG/1; MG/1; MG/1; MG/1
30 CAPSULE, EXTENDED RELEASE ORAL DAILY
Qty: 30 CAPSULE | Refills: 0 | Status: SHIPPED | OUTPATIENT
Start: 2024-12-30

## 2025-01-07 ENCOUNTER — MYC MEDICAL ADVICE (OUTPATIENT)
Dept: FAMILY MEDICINE | Facility: CLINIC | Age: 25
End: 2025-01-07
Payer: COMMERCIAL

## 2025-01-07 DIAGNOSIS — F90.0 ATTENTION DEFICIT HYPERACTIVITY DISORDER (ADHD), PREDOMINANTLY INATTENTIVE TYPE: ICD-10-CM

## 2025-01-08 RX ORDER — DEXTROAMPHETAMINE SACCHARATE, AMPHETAMINE ASPARTATE MONOHYDRATE, DEXTROAMPHETAMINE SULFATE AND AMPHETAMINE SULFATE 7.5; 7.5; 7.5; 7.5 MG/1; MG/1; MG/1; MG/1
30 CAPSULE, EXTENDED RELEASE ORAL DAILY
Qty: 30 CAPSULE | Refills: 0 | Status: SHIPPED | OUTPATIENT
Start: 2025-01-08

## 2025-01-08 RX ORDER — DEXTROAMPHETAMINE SACCHARATE, AMPHETAMINE ASPARTATE, DEXTROAMPHETAMINE SULFATE AND AMPHETAMINE SULFATE 2.5; 2.5; 2.5; 2.5 MG/1; MG/1; MG/1; MG/1
10 TABLET ORAL DAILY
Qty: 30 TABLET | Refills: 0 | Status: SHIPPED | OUTPATIENT
Start: 2025-01-23

## 2025-01-26 ENCOUNTER — E-VISIT (OUTPATIENT)
Dept: FAMILY MEDICINE | Facility: CLINIC | Age: 25
End: 2025-01-26
Payer: COMMERCIAL

## 2025-01-26 DIAGNOSIS — L02.419 CELLULITIS AND ABSCESS OF LEG: Primary | ICD-10-CM

## 2025-01-26 DIAGNOSIS — L03.119 CELLULITIS AND ABSCESS OF LEG: Primary | ICD-10-CM

## 2025-01-27 RX ORDER — DOXYCYCLINE 100 MG/1
100 CAPSULE ORAL 2 TIMES DAILY
Qty: 20 CAPSULE | Refills: 0 | Status: SHIPPED | OUTPATIENT
Start: 2025-01-27 | End: 2025-02-06

## 2025-01-27 NOTE — PATIENT INSTRUCTIONS
Dear Rhea Mcgraw    In reviewing your picture and your history, it appears that you have developed another boil. The last time I can see this happening, we used doxycycline, so I sent that to your pharmacy. You can use warm compresses. If symptoms are worsening, the abscess may need to be drained. You can make an appointment with any of us in [place] at your convenience, or you could be seen at urgent care if that works better for you. I'm hoping the antibiotics will be effective and the swelling and pain will soon improve.    Thanks for choosing us as your health care partner,    Vielka Foster MD

## 2025-02-25 ENCOUNTER — MYC MEDICAL ADVICE (OUTPATIENT)
Dept: FAMILY MEDICINE | Facility: CLINIC | Age: 25
End: 2025-02-25
Payer: COMMERCIAL

## 2025-02-25 DIAGNOSIS — F90.0 ATTENTION DEFICIT HYPERACTIVITY DISORDER (ADHD), PREDOMINANTLY INATTENTIVE TYPE: ICD-10-CM

## 2025-02-26 RX ORDER — DEXTROAMPHETAMINE SACCHARATE, AMPHETAMINE ASPARTATE MONOHYDRATE, DEXTROAMPHETAMINE SULFATE AND AMPHETAMINE SULFATE 7.5; 7.5; 7.5; 7.5 MG/1; MG/1; MG/1; MG/1
30 CAPSULE, EXTENDED RELEASE ORAL DAILY
Qty: 30 CAPSULE | Refills: 0 | Status: SHIPPED | OUTPATIENT
Start: 2025-02-26

## 2025-02-26 NOTE — TELEPHONE ENCOUNTER
Routing refill request to provider for review/approval because:  Drug not on the [facility] refill protocol   Pt requesting alternate pharmacy due to availability.

## 2025-04-01 ENCOUNTER — MYC REFILL (OUTPATIENT)
Dept: FAMILY MEDICINE | Facility: CLINIC | Age: 25
End: 2025-04-01
Payer: COMMERCIAL

## 2025-04-01 DIAGNOSIS — F90.0 ATTENTION DEFICIT HYPERACTIVITY DISORDER (ADHD), PREDOMINANTLY INATTENTIVE TYPE: ICD-10-CM

## 2025-04-01 RX ORDER — DEXTROAMPHETAMINE SACCHARATE, AMPHETAMINE ASPARTATE, DEXTROAMPHETAMINE SULFATE AND AMPHETAMINE SULFATE 2.5; 2.5; 2.5; 2.5 MG/1; MG/1; MG/1; MG/1
10 TABLET ORAL DAILY
Qty: 30 TABLET | Refills: 0 | Status: CANCELLED | OUTPATIENT
Start: 2025-04-01

## 2025-04-02 RX ORDER — DEXTROAMPHETAMINE SACCHARATE, AMPHETAMINE ASPARTATE, DEXTROAMPHETAMINE SULFATE AND AMPHETAMINE SULFATE 2.5; 2.5; 2.5; 2.5 MG/1; MG/1; MG/1; MG/1
10 TABLET ORAL DAILY
Qty: 30 TABLET | Refills: 0 | Status: SHIPPED | OUTPATIENT
Start: 2025-04-30

## 2025-04-21 ENCOUNTER — OFFICE VISIT (OUTPATIENT)
Dept: FAMILY MEDICINE | Facility: CLINIC | Age: 25
End: 2025-04-21
Payer: COMMERCIAL

## 2025-04-21 VITALS
HEIGHT: 68 IN | SYSTOLIC BLOOD PRESSURE: 130 MMHG | BODY MASS INDEX: 18.87 KG/M2 | DIASTOLIC BLOOD PRESSURE: 60 MMHG | WEIGHT: 124.5 LBS | HEART RATE: 112 BPM | TEMPERATURE: 99.1 F | RESPIRATION RATE: 14 BRPM | OXYGEN SATURATION: 94 %

## 2025-04-21 DIAGNOSIS — F90.0 ATTENTION DEFICIT HYPERACTIVITY DISORDER (ADHD), PREDOMINANTLY INATTENTIVE TYPE: Primary | ICD-10-CM

## 2025-04-21 DIAGNOSIS — H81.10 BENIGN PAROXYSMAL POSITIONAL VERTIGO, UNSPECIFIED LATERALITY: ICD-10-CM

## 2025-04-21 PROCEDURE — 3075F SYST BP GE 130 - 139MM HG: CPT | Performed by: FAMILY MEDICINE

## 2025-04-21 PROCEDURE — 99213 OFFICE O/P EST LOW 20 MIN: CPT | Performed by: FAMILY MEDICINE

## 2025-04-21 PROCEDURE — 3078F DIAST BP <80 MM HG: CPT | Performed by: FAMILY MEDICINE

## 2025-04-21 RX ORDER — DEXTROAMPHETAMINE SACCHARATE, AMPHETAMINE ASPARTATE, DEXTROAMPHETAMINE SULFATE AND AMPHETAMINE SULFATE 2.5; 2.5; 2.5; 2.5 MG/1; MG/1; MG/1; MG/1
10 TABLET ORAL DAILY
Qty: 30 TABLET | Refills: 0 | Status: SHIPPED | OUTPATIENT
Start: 2025-05-28

## 2025-04-21 RX ORDER — DEXTROAMPHETAMINE SACCHARATE, AMPHETAMINE ASPARTATE MONOHYDRATE, DEXTROAMPHETAMINE SULFATE AND AMPHETAMINE SULFATE 7.5; 7.5; 7.5; 7.5 MG/1; MG/1; MG/1; MG/1
30 CAPSULE, EXTENDED RELEASE ORAL DAILY
Qty: 30 CAPSULE | Refills: 0 | Status: SHIPPED | OUTPATIENT
Start: 2025-04-30

## 2025-04-21 RX ORDER — DEXTROAMPHETAMINE SACCHARATE, AMPHETAMINE ASPARTATE, DEXTROAMPHETAMINE SULFATE AND AMPHETAMINE SULFATE 2.5; 2.5; 2.5; 2.5 MG/1; MG/1; MG/1; MG/1
10 TABLET ORAL DAILY
Qty: 30 TABLET | Refills: 0 | Status: SHIPPED | OUTPATIENT
Start: 2025-04-21

## 2025-04-21 RX ORDER — DEXTROAMPHETAMINE SACCHARATE, AMPHETAMINE ASPARTATE, DEXTROAMPHETAMINE SULFATE AND AMPHETAMINE SULFATE 2.5; 2.5; 2.5; 2.5 MG/1; MG/1; MG/1; MG/1
10 TABLET ORAL DAILY
Qty: 30 TABLET | Refills: 0 | Status: SHIPPED | OUTPATIENT
Start: 2025-06-25

## 2025-04-21 RX ORDER — DEXTROAMPHETAMINE SACCHARATE, AMPHETAMINE ASPARTATE MONOHYDRATE, DEXTROAMPHETAMINE SULFATE AND AMPHETAMINE SULFATE 7.5; 7.5; 7.5; 7.5 MG/1; MG/1; MG/1; MG/1
30 CAPSULE, EXTENDED RELEASE ORAL DAILY
Qty: 30 CAPSULE | Refills: 0 | Status: SHIPPED | OUTPATIENT
Start: 2025-05-28

## 2025-04-21 RX ORDER — MECLIZINE HYDROCHLORIDE 25 MG/1
25 TABLET ORAL 3 TIMES DAILY PRN
Qty: 15 TABLET | Refills: 1 | Status: SHIPPED | OUTPATIENT
Start: 2025-04-21

## 2025-04-21 RX ORDER — DEXTROAMPHETAMINE SACCHARATE, AMPHETAMINE ASPARTATE MONOHYDRATE, DEXTROAMPHETAMINE SULFATE AND AMPHETAMINE SULFATE 7.5; 7.5; 7.5; 7.5 MG/1; MG/1; MG/1; MG/1
30 CAPSULE, EXTENDED RELEASE ORAL DAILY
Qty: 30 CAPSULE | Refills: 0 | Status: SHIPPED | OUTPATIENT
Start: 2025-06-25

## 2025-04-21 ASSESSMENT — PATIENT HEALTH QUESTIONNAIRE - PHQ9
SUM OF ALL RESPONSES TO PHQ QUESTIONS 1-9: 3
10. IF YOU CHECKED OFF ANY PROBLEMS, HOW DIFFICULT HAVE THESE PROBLEMS MADE IT FOR YOU TO DO YOUR WORK, TAKE CARE OF THINGS AT HOME, OR GET ALONG WITH OTHER PEOPLE: SOMEWHAT DIFFICULT
SUM OF ALL RESPONSES TO PHQ QUESTIONS 1-9: 3

## 2025-04-21 NOTE — PATIENT INSTRUCTIONS
Patient Education   Preventive Care Advice   This is general advice given by our system to help you stay healthy. However, your care team may have specific advice just for you. Please talk to your care team about your preventive care needs.  Nutrition  Eat 5 or more servings of fruits and vegetables each day.  Try wheat bread, brown rice and whole grain pasta (instead of white bread, rice, and pasta).  Get enough calcium and vitamin D. Check the label on foods and aim for 100% of the RDA (recommended daily allowance).  Lifestyle  Exercise at least 150 minutes each week  (30 minutes a day, 5 days a week).  Do muscle strengthening activities 2 days a week. These help control your weight and prevent disease.  No smoking.  Wear sunscreen to prevent skin cancer.  Have a dental exam and cleaning every 6 months.  Yearly exams  See your health care team every year to talk about:  Any changes in your health.  Any medicines your care team has prescribed.  Preventive care, family planning, and ways to prevent chronic diseases.  Shots (vaccines)   HPV shots (up to age 26), if you've never had them before.  Hepatitis B shots (up to age 59), if you've never had them before.  COVID-19 shot: Get this shot when it's due.  Flu shot: Get a flu shot every year.  Tetanus shot: Get a tetanus shot every 10 years.  Pneumococcal, hepatitis A, and RSV shots: Ask your care team if you need these based on your risk.  Shingles shot (for age 50 and up)  General health tests  Diabetes screening:  Starting at age 35, Get screened for diabetes at least every 3 years.  If you are younger than age 35, ask your care team if you should be screened for diabetes.  Cholesterol test: At age 39, start having a cholesterol test every 5 years, or more often if advised.  Bone density scan (DEXA): At age 50, ask your care team if you should have this scan for osteoporosis (brittle bones).  Hepatitis C: Get tested at least once in your life.  STIs (sexually  transmitted infections)  Before age 24: Ask your care team if you should be screened for STIs.  After age 24: Get screened for STIs if you're at risk. You are at risk for STIs (including HIV) if:  You are sexually active with more than one person.  You don't use condoms every time.  You or a partner was diagnosed with a sexually transmitted infection.  If you are at risk for HIV, ask about PrEP medicine to prevent HIV.  Get tested for HIV at least once in your life, whether you are at risk for HIV or not.  Cancer screening tests  Cervical cancer screening: If you have a cervix, begin getting regular cervical cancer screening tests starting at age 21.  Breast cancer scan (mammogram): If you've ever had breasts, begin having regular mammograms starting at age 40. This is a scan to check for breast cancer.  Colon cancer screening: It is important to start screening for colon cancer at age 45.  Have a colonoscopy test every 10 years (or more often if you're at risk) Or, ask your provider about stool tests like a FIT test every year or Cologuard test every 3 years.  To learn more about your testing options, visit:   .  For help making a decision, visit:   https://bit.ly/ie19156.  Prostate cancer screening test: If you have a prostate, ask your care team if a prostate cancer screening test (PSA) at age 55 is right for you.  Lung cancer screening: If you are a current or former smoker ages 50 to 80, ask your care team if ongoing lung cancer screenings are right for you.  For informational purposes only. Not to replace the advice of your health care provider. Copyright   2023 [employer]. All rights reserved. Clinically reviewed by the [facility] Transitions Program. [employer] 299024 - REV 01/24.

## 2025-04-21 NOTE — PROGRESS NOTES
Assessment & Plan     Assessment & Plan  Attention deficit hyperactivity disorder (ADHD), predominantly inattentive type  - Continues to use 5 mg medication regularly, taking 5 mg in the morning and 5 mg at 2 PM to last all day.  - Refill ADHD medication for the next three months, with refills due next week, the last week of May, and the last week of June.    Benign paroxysmal positional vertigo, unspecified laterality  - No bacterial infection observed; vertigo likely due to inner ear issues with displaced crystals causing proprioception disconnect.  - Prescribe meclizine for symptom control to help settle vertigo symptoms.    Prescription  - Meclizine, for symptom control of vertigo  - ADHD medication, 5 mg, taken regularly, with refills for the next three months    Appointments  - Follow-up appointment in June 2025    Consent was obtained from the patient to use an AI documentation tool in the creation of this note.                Toribio Wilkerson is a 25 year old, presenting for the following health issues:  Recheck Medication and Ear Problem (Wondering about ear infection - symptoms are vertigo. )        4/21/2025     3:49 PM   Additional Questions   Roomed by Haleigh SANTIAGO CMA   Accompanied by None     History of Present Illness       Reason for visit:  Med check    She eats 2-3 servings of fruits and vegetables daily.She consumes 0 sweetened beverage(s) daily.She exercises with enough effort to increase her heart rate 10 to 19 minutes per day.  She exercises with enough effort to increase her heart rate 4 days per week.   She is taking medications regularly.   - Rhea HAYES Sergey Mcgraw, 25-year-old female  - Experiencing severe vertigo for the past 24 hours  - Dizziness occurs when getting up or lying down with eyes closed  - No bacterial infection detected in the ear  - Right side of the ear is where symptoms are noticed  - Water may have entered the ear, potentially contributing to symptoms  - No previous use  "of medication like meclizine for vertigo symptoms  History of Present Illness-  - Rhea HAYES Sergey Mcgraw, 25-year-old female  - Experiencing severe vertigo for the past 24 hours  - Dizziness occurs when getting up or lying down with eyes closed  - No bacterial infection detected in the ear  - Right side of the ear is where symptoms are noticed  - Water may have entered the ear, potentially contributing to symptoms  - No previous use of medication like meclizine for vertigo symptoms    ADHD medications are working well.  Tolerating medications. Only using 10mg doses sparingly              Objective    /60   Pulse 112   Temp 99.1  F (37.3  C)   Resp 14   Ht 1.715 m (5' 7.52\")   Wt 56.5 kg (124 lb 8 oz)   LMP 04/07/2025 (Within Days)   SpO2 94%   BMI 19.20 kg/m    Body mass index is 19.2 kg/m .  Physical Exam   GENERAL: alert and no distress  HENT: ear canals and TM's normal, nose and mouth without ulcers or lesions  NECK: no adenopathy, no asymmetry, masses, or scars            Signed Electronically by: Vielka Foster MD    "

## 2025-06-23 ENCOUNTER — OFFICE VISIT (OUTPATIENT)
Dept: FAMILY MEDICINE | Facility: CLINIC | Age: 25
End: 2025-06-23
Payer: COMMERCIAL

## 2025-06-23 VITALS
HEART RATE: 65 BPM | RESPIRATION RATE: 12 BRPM | SYSTOLIC BLOOD PRESSURE: 124 MMHG | OXYGEN SATURATION: 99 % | WEIGHT: 126.5 LBS | DIASTOLIC BLOOD PRESSURE: 78 MMHG | HEIGHT: 67 IN | BODY MASS INDEX: 19.85 KG/M2 | TEMPERATURE: 98.4 F

## 2025-06-23 DIAGNOSIS — Z12.4 CERVICAL CANCER SCREENING: ICD-10-CM

## 2025-06-23 DIAGNOSIS — Z00.00 ROUTINE GENERAL MEDICAL EXAMINATION AT A HEALTH CARE FACILITY: Primary | ICD-10-CM

## 2025-06-23 DIAGNOSIS — J30.89 SEASONAL ALLERGIC RHINITIS DUE TO OTHER ALLERGIC TRIGGER: ICD-10-CM

## 2025-06-23 DIAGNOSIS — F41.1 GENERALIZED ANXIETY DISORDER: ICD-10-CM

## 2025-06-23 DIAGNOSIS — F90.0 ATTENTION DEFICIT HYPERACTIVITY DISORDER (ADHD), PREDOMINANTLY INATTENTIVE TYPE: ICD-10-CM

## 2025-06-23 PROCEDURE — G0145 SCR C/V CYTO,THINLAYER,RESCR: HCPCS | Performed by: FAMILY MEDICINE

## 2025-06-23 PROCEDURE — 3074F SYST BP LT 130 MM HG: CPT | Performed by: FAMILY MEDICINE

## 2025-06-23 PROCEDURE — 99213 OFFICE O/P EST LOW 20 MIN: CPT | Mod: 25 | Performed by: FAMILY MEDICINE

## 2025-06-23 PROCEDURE — 99395 PREV VISIT EST AGE 18-39: CPT | Performed by: FAMILY MEDICINE

## 2025-06-23 PROCEDURE — 3078F DIAST BP <80 MM HG: CPT | Performed by: FAMILY MEDICINE

## 2025-06-23 RX ORDER — ALBUTEROL SULFATE 90 UG/1
2 INHALANT RESPIRATORY (INHALATION) EVERY 4 HOURS PRN
Qty: 18 G | Refills: 3 | Status: SHIPPED | OUTPATIENT
Start: 2025-06-23

## 2025-06-23 RX ORDER — DEXTROAMPHETAMINE SACCHARATE, AMPHETAMINE ASPARTATE, DEXTROAMPHETAMINE SULFATE AND AMPHETAMINE SULFATE 1.25; 1.25; 1.25; 1.25 MG/1; MG/1; MG/1; MG/1
5 TABLET ORAL DAILY
Qty: 30 TABLET | Refills: 0 | Status: SHIPPED | OUTPATIENT
Start: 2025-06-23

## 2025-06-23 RX ORDER — DEXTROAMPHETAMINE SACCHARATE, AMPHETAMINE ASPARTATE, DEXTROAMPHETAMINE SULFATE AND AMPHETAMINE SULFATE 1.25; 1.25; 1.25; 1.25 MG/1; MG/1; MG/1; MG/1
5 TABLET ORAL DAILY
Qty: 30 TABLET | Refills: 0 | Status: SHIPPED | OUTPATIENT
Start: 2025-08-18

## 2025-06-23 RX ORDER — DEXTROAMPHETAMINE SACCHARATE, AMPHETAMINE ASPARTATE MONOHYDRATE, DEXTROAMPHETAMINE SULFATE AND AMPHETAMINE SULFATE 7.5; 7.5; 7.5; 7.5 MG/1; MG/1; MG/1; MG/1
30 CAPSULE, EXTENDED RELEASE ORAL DAILY
Qty: 30 CAPSULE | Refills: 0 | Status: SHIPPED | OUTPATIENT
Start: 2025-08-18

## 2025-06-23 RX ORDER — DEXTROAMPHETAMINE SACCHARATE, AMPHETAMINE ASPARTATE, DEXTROAMPHETAMINE SULFATE AND AMPHETAMINE SULFATE 1.25; 1.25; 1.25; 1.25 MG/1; MG/1; MG/1; MG/1
5 TABLET ORAL DAILY
Qty: 30 TABLET | Refills: 0 | Status: SHIPPED | OUTPATIENT
Start: 2025-07-21

## 2025-06-23 RX ORDER — DEXTROAMPHETAMINE SACCHARATE, AMPHETAMINE ASPARTATE MONOHYDRATE, DEXTROAMPHETAMINE SULFATE AND AMPHETAMINE SULFATE 7.5; 7.5; 7.5; 7.5 MG/1; MG/1; MG/1; MG/1
30 CAPSULE, EXTENDED RELEASE ORAL DAILY
Qty: 30 CAPSULE | Refills: 0 | Status: SHIPPED | OUTPATIENT
Start: 2025-07-21

## 2025-06-23 RX ORDER — PROPRANOLOL HYDROCHLORIDE 10 MG/1
10 TABLET ORAL 3 TIMES DAILY PRN
Qty: 60 TABLET | Refills: 5 | Status: SHIPPED | OUTPATIENT
Start: 2025-06-23

## 2025-06-23 RX ORDER — DEXTROAMPHETAMINE SACCHARATE, AMPHETAMINE ASPARTATE MONOHYDRATE, DEXTROAMPHETAMINE SULFATE AND AMPHETAMINE SULFATE 7.5; 7.5; 7.5; 7.5 MG/1; MG/1; MG/1; MG/1
30 CAPSULE, EXTENDED RELEASE ORAL DAILY
Qty: 30 CAPSULE | Refills: 0 | Status: SHIPPED | OUTPATIENT
Start: 2025-06-23

## 2025-06-23 SDOH — HEALTH STABILITY: PHYSICAL HEALTH: ON AVERAGE, HOW MANY DAYS PER WEEK DO YOU ENGAGE IN MODERATE TO STRENUOUS EXERCISE (LIKE A BRISK WALK)?: 2 DAYS

## 2025-06-23 SDOH — HEALTH STABILITY: PHYSICAL HEALTH: ON AVERAGE, HOW MANY MINUTES DO YOU ENGAGE IN EXERCISE AT THIS LEVEL?: 20 MIN

## 2025-06-23 ASSESSMENT — SOCIAL DETERMINANTS OF HEALTH (SDOH): HOW OFTEN DO YOU GET TOGETHER WITH FRIENDS OR RELATIVES?: PATIENT DECLINED

## 2025-06-23 NOTE — LETTER
[facility] [place]  06/23/25  Patient: Rhea Mcgraw  YOB: 2000  Medical Record Number: [id number]                                                                                  Non-Opioid Controlled Substance Agreement              amphetamine-dextroamphetamine (ADDERALL XR) 30 MG 24 hr capsule       amphetamine-dextroamphetamine (ADDERALL) 10 MG tablet      This is an agreement between you and your provider regarding safe and appropriate use of controlled substances prescribed by your care team. Controlled substances are?medicines that can cause physical and mental dependence (abuse).     There are strict laws about having and using these medicines. We here at [facility] are  committed to working with you in your efforts to get better. To support you in this work, we'll help you schedule regular office appointments for medicine refills. If we must cancel or change your appointment for any reason, we'll make sure you have enough medicine to last until your next appointment.     As a Provider, I will:   Listen carefully to your concerns while treating you with respect.   Recommend a treatment plan that I believe is in your best interest and may involve therapies other than medicine.    Talk with you often about the possible benefits and the risk of harm of any medicine that we prescribe for you.  Assess the safety of this medicine and check how well it works.    Provide a plan on how to taper (discontinue or go off) using this medicine if the decision is made to stop its use.      ::  As a Patient, I understand controlled substances:     Are prescribed by my care provider to help me function or work and manage my condition(s).?  Are strong medicines and can cause serious side effects.     Need to be taken exactly as prescribed.?Combining controlled substances with certain medicines or chemicals (such as illegal drugs, alcohol, sedatives, sleeping pills, and  benzodiazepines) can be dangerous or even fatal.? If I stop taking my medicines suddenly, I may have severe withdrawal symptoms.     The risks, benefits, and side effects of these medicine(s) were explained to me. I agree that:    I will take part in other treatments as advised by my care team. This may be psychiatry or counseling, physical therapy, behavioral therapy, group treatment or a referral to specialist.    I will keep all my appointments and understand this is part of the monitoring of controlled substances.?My care team may require an office visit for EVERY controlled substance refill. If I miss appointments or don t follow instructions, my care team may stop my medicine    I will take my medicines as prescribed. I will not change the dose or schedule unless my care team tells me to. There will be no refills if I run out early.      I may be asked to come to the clinic and complete a urine drug test or complete a pill count. If I don t give a urine sample or participate in a pill count, the care team may stop my medicine.    I will only receive controlled substance prescriptions from this clinic. If I am treated by another provider, I will tell them that I am taking controlled substances and that I have a treatment agreement with this provider. I will inform my [facility] care team within one business day if I am given a prescription for any controlled substance by another healthcare provider. My [facility] care team can contact other providers and pharmacists about my use of any medicines.    It is up to me to make sure that I don't run out of my medicines on weekends or holidays.?If my care team is willing to refill my prescription without a visit, I must request refills only during office hours. Refills may take up to 3 business days to process. I will use one pharmacy to fill all my controlled substance prescriptions. I will notify the clinic about any changes to my insurance or medicine  availability.    I am responsible for my prescriptions. If the medicine/prescription is lost, stolen or destroyed, it will not be replaced.?I also agree not to share controlled substance medicines with anyone.     I am aware I should not use any illegal or recreational drugs. I agree not to drink alcohol unless my care team says I can.     If I enroll in the [place] [facility] Cannabis program, I will tell my care team before my next refill.    I will tell my care team right away if I become pregnant, have a new medical problem treated outside of my regular clinic, or have a change in my medicines.     I understand that this medicine can affect my thinking, judgment and reaction time.? Alcohol and drugs affect the brain and body, which can affect the safety of my driving. Being under the influence of alcohol or drugs can affect my decision-making, behaviors, personal safety and the safety of others. Driving while impaired (DWI) can occur if a person is driving, operating or in physical control of a car, motorcycle, boat, snowmobile, ATV, motorbike, off-road vehicle or any other motor vehicle ([place] Statute 169A.20). I understand the risk if I choose to drive or operate any vehicle or machinery.    I understand that if I do not follow any of the conditions above, my prescriptions or treatment may be stopped or changed.   I agree that my provider, clinic care team and pharmacy may work with any city, state or federal law enforcement agency that investigates the misuse, sale or other diversion of my controlled medicine. I will allow my provider to discuss my care with, or share a copy of, this agreement with any other treating provider, pharmacy or emergency room where I receive care.     I have read this agreement and have asked questions about anything I did not understand.    ________________________________________________________  Patient Signature - Rhea Mcgraw     ___________________                    Date     ________________________________________________________  Provider Signature - Kerith W. Lijewski, MD       ___________________                   Date     ________________________________________________________  Witness Signature (required if provider not present while patient signing)          ___________________                   Date

## 2025-06-23 NOTE — PATIENT INSTRUCTIONS
Patient Education   Preventive Care Advice   This is general advice given by our system to help you stay healthy. However, your care team may have specific advice just for you. Please talk to your care team about your preventive care needs.  Nutrition  Eat 5 or more servings of fruits and vegetables each day.  Try wheat bread, brown rice and whole grain pasta (instead of white bread, rice, and pasta).  Get enough calcium and vitamin D. Check the label on foods and aim for 100% of the RDA (recommended daily allowance).  Lifestyle  Exercise at least 150 minutes each week  (30 minutes a day, 5 days a week).  Do muscle strengthening activities 2 days a week. These help control your weight and prevent disease.  No smoking.  Wear sunscreen to prevent skin cancer.  Have a dental exam and cleaning every 6 months.  Yearly exams  See your health care team every year to talk about:  Any changes in your health.  Any medicines your care team has prescribed.  Preventive care, family planning, and ways to prevent chronic diseases.  Shots (vaccines)   HPV shots (up to age 26), if you've never had them before.  Hepatitis B shots (up to age 59), if you've never had them before.  COVID-19 shot: Get this shot when it's due.  Flu shot: Get a flu shot every year.  Tetanus shot: Get a tetanus shot every 10 years.  Pneumococcal, hepatitis A, and RSV shots: Ask your care team if you need these based on your risk.  Shingles shot (for age 50 and up)  General health tests  Diabetes screening:  Starting at age 35, Get screened for diabetes at least every 3 years.  If you are younger than age 35, ask your care team if you should be screened for diabetes.  Cholesterol test: At age 39, start having a cholesterol test every 5 years, or more often if advised.  Bone density scan (DEXA): At age 50, ask your care team if you should have this scan for osteoporosis (brittle bones).  Hepatitis C: Get tested at least once in your life.  STIs (sexually  transmitted infections)  Before age 24: Ask your care team if you should be screened for STIs.  After age 24: Get screened for STIs if you're at risk. You are at risk for STIs (including HIV) if:  You are sexually active with more than one person.  You don't use condoms every time.  You or a partner was diagnosed with a sexually transmitted infection.  If you are at risk for HIV, ask about PrEP medicine to prevent HIV.  Get tested for HIV at least once in your life, whether you are at risk for HIV or not.  Cancer screening tests  Cervical cancer screening: If you have a cervix, begin getting regular cervical cancer screening tests starting at age 21.  Breast cancer scan (mammogram): If you've ever had breasts, begin having regular mammograms starting at age 40. This is a scan to check for breast cancer.  Colon cancer screening: It is important to start screening for colon cancer at age 45.  Have a colonoscopy test every 10 years (or more often if you're at risk) Or, ask your provider about stool tests like a FIT test every year or Cologuard test every 3 years.  To learn more about your testing options, visit:   .  For help making a decision, visit:   https://bit.ly/hp99096.  Prostate cancer screening test: If you have a prostate, ask your care team if a prostate cancer screening test (PSA) at age 55 is right for you.  Lung cancer screening: If you are a current or former smoker ages 50 to 80, ask your care team if ongoing lung cancer screenings are right for you.  For informational purposes only. Not to replace the advice of your health care provider. Copyright   2023 [facility]. All rights reserved. Clinically reviewed by the [facility] Transitions Program. [employer] 229684 - REV 01/24.  Substance Use Disorder: Care Instructions  Overview     You can improve your life and health by stopping your use of alcohol or drugs. When you don't drink or use drugs, you may feel and sleep better. You may  get along better with your family, friends, and coworkers. There are medicines and programs that can help with substance use disorder.  How can you care for yourself at home?  Here are some ways to help you stay sober and prevent relapse.  If you have been given medicine to help keep you sober or reduce your cravings, be sure to take it exactly as prescribed.  Talk to your doctor about programs that can help you stop using drugs or drinking alcohol.  Do not keep alcohol or drugs in your home.  Plan ahead. Think about what you'll say if other people ask you to drink or use drugs. Try not to spend time with people who drink or use drugs.  Use the time and money spent on drinking or drugs to do something that's important to you.  Preventing a relapse  Have a plan to deal with relapse. Learn to recognize changes in your thinking that lead you to drink or use drugs. Get help before you start to drink or use drugs again.  Try to stay away from situations, friends, or places that may lead you to drink or use drugs.  If you feel the need to drink alcohol or use drugs again, seek help right away. Call a trusted friend or family member. Some people get support from organizations such as Narcotics Anonymous or [employer] or from treatment facilities.  If you relapse, get help as soon as you can. Some people make a plan with another person that outlines what they want that person to do for them if they relapse. The plan usually includes how to handle the relapse and who to notify in case of relapse.  Don't give up. Remember that a relapse doesn't mean that you have failed. Use the experience to learn the triggers that lead you to drink or use drugs. Then quit again. Recovery is a lifelong process. Many people have several relapses before they are able to quit for good.  Follow-up care is a key part of your treatment and safety. Be sure to make and go to all appointments, and call your doctor if you are having problems. It's  "also a good idea to know your test results and keep a list of the medicines you take.  When should you call for help?   Call 911  anytime you think you may need emergency care. For example, call if you or someone else:    Has overdosed or has withdrawal signs. Be sure to tell the emergency workers that you are or someone else is using or trying to quit using drugs. Overdose or withdrawal signs may include:  Losing consciousness.  Seizure.  Seeing or hearing things that aren't there (hallucinations).     Is thinking or talking about suicide or harming others.   Where to get help 24 hours a day, 7 days a week   If you or someone you know talks about suicide, self-harm, a mental health crisis, a substance use crisis, or any other kind of emotional distress, get help right away. You can:    Call the Suicide and Crisis Lifeline at 988.     Call 8-931-900-TALK (1-957.814.1746).     Text HOME to 228124 to access the Crisis Text Line.   Consider saving these numbers in your phone.  Go to [employer] for more information or to chat online.  Call your doctor now or seek immediate medical care if:    You are having withdrawal symptoms. These may include nausea or vomiting, sweating, shakiness, and anxiety.   Watch closely for changes in your health, and be sure to contact your doctor if:    You have a relapse.     You need more help or support to stop.   Where can you learn more?  Go to https://www.myhub.net/patiented  Enter H573 in the search box to learn more about \"Substance Use Disorder: Care Instructions.\"  Current as of: August 20, 2024  Content Version: 14.5 2024-2025 [employer].   Care instructions adapted under license by your healthcare professional. If you have questions about a medical condition or this instruction, always ask your healthcare professional. [employer] disclaims any warranty or liability for your use of this information.       "

## 2025-06-23 NOTE — PROGRESS NOTES
Preventive Care Visit  [facility]  Vielka Foster MD, Family Medicine  Jun 23, 2025      Assessment & Plan     Assessment & Plan  Routine general medical examination at a health care facility  Health maintenance updated, preventive services reviewed, vaccines discussed, questions are answered, patient encouraged to continue annual wellness visits to review preventive services    Cervical cancer screening  Pap smear completed today    Attention deficit hyperactivity disorder (ADHD), predominantly inattentive type:  - ADHD managed with Adderall XR and short-acting Adderall.  - Update Adderall XR refills. Change short-acting Adderall prescription to 5 mg tablets.    Generalized anxiety disorder:  - Generalized anxiety disorder with history of multiple medication trials. Propranolol considered for anxiety management.  - Start propranolol, with option to take daily or as needed. Consider mirtazapine if propranolol is insufficient.  - Risks and side effects: Potential side effects of propranolol include dizziness, lightheadedness, and low blood pressure.    Seasonal allergic rhinitis due to other allergic trigger  Uses albuterol as needed for exacerbations, refilled today    Prescription  - Propranolol, as needed or daily, potential side effects include dizziness, lightheadedness, and low blood pressure.  - Adderall 5 mg, short-acting.  - Albuterol, refill.  - COVID booster, to be administered in August 2025.    Appointments  - Follow-up appointment in 6 months for medication refill visit    Consent was obtained from the patient to use an AI documentation tool in the creation of this note.            Counseling  Appropriate preventive services were addressed with this patient via screening, questionnaire, or discussion as appropriate for fall prevention, nutrition, physical activity, Tobacco-use cessation, social engagement, weight loss and cognition.  Checklist reviewing preventive services  available has been given to the patient.  Reviewed patient's diet, addressing concerns and/or questions.   She is at risk for lack of exercise and has been provided with information to increase physical activity for the benefit of her well-being.   The patient was instructed to see the dentist every 6 months.             Toribio Wilkerson is a 25 year old, presenting for the following:  Physical and Recheck Medication        6/23/2025    10:09 AM   Additional Questions   Roomed by Haleigh SANTIAOG CMA   Accompanied by None          History of Present Illness-  Rhea Mcgraw, 25 years    Here for annual preventive exam with other concerns    ADHD follow-up  Has been finding 5 mg is adequate to control afternoon symptoms, otherwise well-controlled, PDMP is reviewed    Anxiety  - Near constant anxiety reported  - High-strung feelings for an unspecified duration  - Tried approximately a dozen anxiety or depression medications from 2017 to 2020 with minimal effect  - Gene site test done in 2018 indicating reduced folic acid conversion and less effective previously used medications    Misc  - No abnormal Pap smear history  Rhea Mcgraw, 25 years    Anxiety  - Near constant anxiety reported  - High-strung feelings for an unspecified duration  - Tried approximately a dozen anxiety or depression medications from 2017 to 2020 with minimal effect  - Gene site test done in 2018 indicating reduced folic acid conversion and less effective previously used medications    Misc  - Gained 20 lbs during freshman year of college  - No abnormal Pap smear history      Advance Care Planning    Discussed advance care planning with patient; however, patient declined at this time.        6/23/2025   General Health   How would you rate your overall physical health? (!) FAIR   Feel stress (tense, anxious, or unable to sleep) Patient declined         6/23/2025   Nutrition   Three or more servings of calcium each day? Yes   Diet:  Regular (no restrictions)   How many servings of fruit and vegetables per day? (!) 2-3   How many sweetened beverages each day? 0-1         6/23/2025   Exercise   Days per week of moderate/strenous exercise 2 days   Average minutes spent exercising at this level 20 min   (!) EXERCISE CONCERN      6/23/2025   Social Factors   Frequency of gathering with friends or relatives Patient declined   Worry food won't last until get money to buy more No   Food not last or not have enough money for food? No   Do you have housing? (Housing is defined as stable permanent housing and does not include staying outside in a car, in a tent, in an abandoned building, in an overnight shelter, or couch-surfing.) Yes   Are you worried about losing your housing? No   Lack of transportation? No   Unable to get utilities (heat,electricity)? No         6/23/2025   Dental   Dentist two times every year? (!) NO         Today's PHQ-2 Score:       6/23/2025    10:02 AM   PHQ-2 ( 1999 [employer])   Q1: Little interest or pleasure in doing things 0   Q2: Feeling down, depressed or hopeless 0   PHQ-2 Score 0    Q1: Little interest or pleasure in doing things Not at all   Q2: Feeling down, depressed or hopeless Not at all   PHQ-2 Score 0       Patient-reported           6/23/2025   Substance Use   Alcohol more than 3/day or more than 7/wk No   Do you use any other substances recreationally? (!) CANNABIS PRODUCTS     Social History     Tobacco Use    Smoking status: Never     Passive exposure: Past    Smokeless tobacco: Never   Vaping Use    Vaping status: Never Used           5/31/2023   LAST FHS-7 RESULTS   1st degree relative breast or ovarian cancer Yes   Any relative bilateral breast cancer Unknown   Any male have breast cancer Unknown   Any ONE woman have BOTH breast AND ovarian cancer Yes   Any woman with breast cancer before 50yrs Unknown   2 or more relatives with breast AND/OR ovarian cancer Yes   2 or more relatives with breast AND/OR bowel  "cancer Unknown                6/23/2025   STI Screening   New sexual partner(s) since last STI/HIV test? No     History of abnormal Pap smear: No - age 21-29 PAP every 3 years recommended             6/23/2025   Contraception/Family Planning   Questions about contraception or family planning No   What are your periods like? Regular        Reviewed and updated as needed this visit by Provider   Tobacco  Allergies  Meds  Problems  Med Hx  Surg Hx  Fam Hx                     Objective    Exam  /78   Pulse 65   Temp 98.4  F (36.9  C)   Resp 12   Ht 1.702 m (5' 7\")   Wt 57.4 kg (126 lb 8 oz)   LMP 06/09/2025 (Within Days)   SpO2 99%   BMI 19.81 kg/m     Estimated body mass index is 19.81 kg/m  as calculated from the following:    Height as of this encounter: 1.702 m (5' 7\").    Weight as of this encounter: 57.4 kg (126 lb 8 oz).    Physical Exam  GENERAL: alert and no distress  EYES: Eyes grossly normal to inspection, PERRL and conjunctivae and sclerae normal  HENT: ear canals and TM's normal, nose and mouth without ulcers or lesions  NECK: no adenopathy, no asymmetry, masses, or scars  RESP: lungs clear to auscultation - no rales, rhonchi or wheezes  CV: regular rate and rhythm, normal S1 S2, no S3 or S4, no murmur, click or rub, no peripheral edema  ABDOMEN: soft, nontender, no hepatosplenomegaly, no masses and bowel sounds normal   (female) w/bimanual: normal female external genitalia, normal urethral meatus, normal vaginal mucosa, and normal cervix/adnexa/uterus without masses or discharge  MS: no gross musculoskeletal defects noted, no edema  SKIN: no suspicious lesions or rashes  NEURO: Normal strength and tone, mentation intact and speech normal  PSYCH: mentation appears normal, affect normal/bright        Signed Electronically by: Vielka Foster MD    "

## 2025-06-25 LAB
BKR LAB AP GYN ADEQUACY: NORMAL
BKR LAB AP GYN INTERPRETATION: NORMAL
BKR LAB AP HPV REFLEX: NORMAL
BKR LAB AP LMP: NORMAL
BKR LAB AP PREVIOUS ABNORMAL: NORMAL
PATH REPORT.COMMENTS IMP SPEC: NORMAL
PATH REPORT.COMMENTS IMP SPEC: NORMAL
PATH REPORT.RELEVANT HX SPEC: NORMAL

## 2025-06-26 ENCOUNTER — RESULTS FOLLOW-UP (OUTPATIENT)
Dept: OBGYN | Facility: CLINIC | Age: 25
End: 2025-06-26